# Patient Record
Sex: FEMALE | Race: WHITE | Employment: OTHER | URBAN - NONMETROPOLITAN AREA
[De-identification: names, ages, dates, MRNs, and addresses within clinical notes are randomized per-mention and may not be internally consistent; named-entity substitution may affect disease eponyms.]

---

## 2019-02-14 LAB
A/G RATIO: NORMAL
ALBUMIN SERPL-MCNC: 4.3 G/DL
ALP BLD-CCNC: 94 U/L
ALT SERPL-CCNC: 8 U/L
AST SERPL-CCNC: 18 U/L
BASOPHILS ABSOLUTE: ABNORMAL /ΜL
BASOPHILS RELATIVE PERCENT: ABNORMAL %
BILIRUB SERPL-MCNC: 0.5 MG/DL (ref 0.1–1.4)
BILIRUBIN DIRECT: 0.1 MG/DL
BILIRUBIN, INDIRECT: NORMAL
BUN BLDV-MCNC: 24 MG/DL
CALCIUM SERPL-MCNC: NORMAL MG/DL
CHLORIDE BLD-SCNC: 105 MMOL/L
CO2: 25 MMOL/L
CREAT SERPL-MCNC: 1.36 MG/DL
EOSINOPHILS ABSOLUTE: ABNORMAL /ΜL
EOSINOPHILS RELATIVE PERCENT: ABNORMAL %
GFR CALCULATED: 37
GLOBULIN: NORMAL
GLUCOSE BLD-MCNC: 54 MG/DL
HCT VFR BLD CALC: 50.9 % (ref 36–46)
HEMOGLOBIN: 15.3 G/DL (ref 12–16)
LYMPHOCYTES ABSOLUTE: ABNORMAL /ΜL
LYMPHOCYTES RELATIVE PERCENT: ABNORMAL %
MCH RBC QN AUTO: ABNORMAL PG
MCHC RBC AUTO-ENTMCNC: ABNORMAL G/DL
MCV RBC AUTO: ABNORMAL FL
MONOCYTES ABSOLUTE: ABNORMAL /ΜL
MONOCYTES RELATIVE PERCENT: ABNORMAL %
NEUTROPHILS ABSOLUTE: ABNORMAL /ΜL
NEUTROPHILS RELATIVE PERCENT: ABNORMAL %
PLATELET # BLD: 194 K/ΜL
PMV BLD AUTO: ABNORMAL FL
POTASSIUM SERPL-SCNC: 4.9 MMOL/L
PROTEIN TOTAL: 7.3 G/DL
RBC # BLD: 5.23 10^6/ΜL
SODIUM BLD-SCNC: 141 MMOL/L
WBC # BLD: 10.07 10^3/ML

## 2019-02-26 PROBLEM — M17.12 PRIMARY OSTEOARTHRITIS OF LEFT KNEE: Status: ACTIVE | Noted: 2018-06-04

## 2019-02-26 PROBLEM — R29.898 WEAKNESS OF RIGHT HIP: Status: ACTIVE | Noted: 2017-02-20

## 2019-02-26 PROBLEM — M19.90 ARTHRITIS: Status: ACTIVE | Noted: 2018-08-14

## 2019-02-26 PROBLEM — M17.12 OSTEOARTHRITIS OF LEFT KNEE: Status: ACTIVE | Noted: 2018-08-16

## 2019-02-26 PROBLEM — M17.12 ARTHRITIS OF LEFT KNEE: Status: ACTIVE | Noted: 2018-03-05

## 2019-02-26 PROBLEM — E66.01 OBESITY, MORBID, BMI 40.0-49.9 (HCC): Status: ACTIVE | Noted: 2017-08-22

## 2019-02-26 PROBLEM — R26.89 IMBALANCE: Status: ACTIVE | Noted: 2017-02-20

## 2019-02-26 PROBLEM — I25.10 ARTERIOSCLEROTIC CORONARY ARTERY DISEASE: Status: ACTIVE | Noted: 2019-02-26

## 2019-02-26 RX ORDER — AMIODARONE HYDROCHLORIDE 200 MG/1
100 TABLET ORAL DAILY
COMMUNITY
Start: 2018-09-24

## 2019-02-26 RX ORDER — LEVOTHYROXINE SODIUM 0.07 MG/1
88 TABLET ORAL DAILY
COMMUNITY
End: 2023-01-20

## 2019-02-26 RX ORDER — ISOSORBIDE MONONITRATE 30 MG/1
30 TABLET, EXTENDED RELEASE ORAL DAILY
COMMUNITY
End: 2023-01-20

## 2019-02-26 RX ORDER — METOPROLOL SUCCINATE 25 MG/1
25 TABLET, EXTENDED RELEASE ORAL DAILY
COMMUNITY
End: 2019-10-08 | Stop reason: ALTCHOICE

## 2019-02-26 RX ORDER — DILTIAZEM HYDROCHLORIDE 300 MG/1
300 CAPSULE, EXTENDED RELEASE ORAL DAILY
COMMUNITY
End: 2020-03-06 | Stop reason: ALTCHOICE

## 2019-02-26 RX ORDER — SENNOSIDES 8.6 MG
650 CAPSULE ORAL PRN
COMMUNITY

## 2019-03-01 ENCOUNTER — OFFICE VISIT (OUTPATIENT)
Dept: NEPHROLOGY | Age: 84
End: 2019-03-01
Payer: MEDICARE

## 2019-03-01 VITALS
DIASTOLIC BLOOD PRESSURE: 70 MMHG | HEART RATE: 76 BPM | BODY MASS INDEX: 40.32 KG/M2 | HEIGHT: 65 IN | WEIGHT: 242 LBS | SYSTOLIC BLOOD PRESSURE: 126 MMHG | OXYGEN SATURATION: 98 %

## 2019-03-01 DIAGNOSIS — N18.30 CKD (CHRONIC KIDNEY DISEASE), STAGE III (HCC): Primary | ICD-10-CM

## 2019-03-01 DIAGNOSIS — I10 ESSENTIAL HYPERTENSION: ICD-10-CM

## 2019-03-01 PROCEDURE — 99203 OFFICE O/P NEW LOW 30 MIN: CPT | Performed by: INTERNAL MEDICINE

## 2019-03-14 ENCOUNTER — HOSPITAL ENCOUNTER (OUTPATIENT)
Dept: ULTRASOUND IMAGING | Age: 84
Discharge: HOME | End: 2019-03-14
Payer: MEDICARE

## 2019-03-14 DIAGNOSIS — N18.30 CKD (CHRONIC KIDNEY DISEASE), STAGE III (HCC): ICD-10-CM

## 2019-03-14 PROCEDURE — 76770 US EXAM ABDO BACK WALL COMP: CPT

## 2019-09-03 LAB
BUN BLDV-MCNC: 21 MG/DL
CALCIUM SERPL-MCNC: 9.5 MG/DL
CHLORIDE BLD-SCNC: 106 MMOL/L
CO2: 23 MMOL/L
CREAT SERPL-MCNC: 1.7 MG/DL
GFR CALCULATED: 31
GLUCOSE BLD-MCNC: 91 MG/DL
POTASSIUM SERPL-SCNC: 4.6 MMOL/L
SODIUM BLD-SCNC: 143 MMOL/L

## 2019-09-16 LAB
BUN BLDV-MCNC: 21 MG/DL
CALCIUM SERPL-MCNC: 9.7 MG/DL
CHLORIDE BLD-SCNC: 109 MMOL/L
CO2: 24 MMOL/L
CREAT SERPL-MCNC: 1.8 MG/DL
GFR CALCULATED: 29
GLUCOSE BLD-MCNC: 89 MG/DL
POTASSIUM SERPL-SCNC: 4.8 MMOL/L
SODIUM BLD-SCNC: 147 MMOL/L

## 2019-09-17 ENCOUNTER — TELEPHONE (OUTPATIENT)
Dept: NEPHROLOGY | Age: 84
End: 2019-09-17

## 2019-09-17 DIAGNOSIS — N18.30 CKD (CHRONIC KIDNEY DISEASE), STAGE III (HCC): Primary | ICD-10-CM

## 2019-10-01 ENCOUNTER — TELEPHONE (OUTPATIENT)
Dept: NEPHROLOGY | Age: 84
End: 2019-10-01

## 2019-10-01 DIAGNOSIS — N18.30 CKD (CHRONIC KIDNEY DISEASE), STAGE III (HCC): Primary | ICD-10-CM

## 2019-10-08 LAB
BUN BLDV-MCNC: 25 MG/DL
CALCIUM SERPL-MCNC: 9.7 MG/DL
CHLORIDE BLD-SCNC: 103 MMOL/L
CO2: 25 MMOL/L
CREAT SERPL-MCNC: 1.5 MG/DL
GFR CALCULATED: 35
GLUCOSE BLD-MCNC: 97 MG/DL
POTASSIUM SERPL-SCNC: 4.6 MMOL/L
SODIUM BLD-SCNC: 143 MMOL/L

## 2019-10-08 RX ORDER — POTASSIUM CHLORIDE 750 MG/1
10 CAPSULE, EXTENDED RELEASE ORAL EVERY OTHER DAY
COMMUNITY
End: 2020-09-18 | Stop reason: SDUPTHER

## 2019-10-08 RX ORDER — FUROSEMIDE 20 MG/1
20 TABLET ORAL EVERY OTHER DAY
COMMUNITY
End: 2020-09-18 | Stop reason: SDUPTHER

## 2019-10-10 ENCOUNTER — TELEPHONE (OUTPATIENT)
Dept: NEPHROLOGY | Age: 84
End: 2019-10-10

## 2019-10-10 DIAGNOSIS — N18.30 CKD (CHRONIC KIDNEY DISEASE), STAGE III (HCC): Primary | ICD-10-CM

## 2020-03-02 LAB
BASOPHILS ABSOLUTE: NORMAL
BASOPHILS RELATIVE PERCENT: NORMAL
BUN BLDV-MCNC: 18 MG/DL
CALCIUM SERPL-MCNC: 9.7 MG/DL
CHLORIDE BLD-SCNC: 107 MMOL/L
CO2: 25 MMOL/L
CREAT SERPL-MCNC: 1 MG/DL
EOSINOPHILS ABSOLUTE: NORMAL
EOSINOPHILS RELATIVE PERCENT: NORMAL
GFR CALCULATED: 56
GLUCOSE BLD-MCNC: 97 MG/DL
HCT VFR BLD CALC: 44.3 % (ref 36–46)
HEMOGLOBIN: 13.8 G/DL (ref 12–16)
LYMPHOCYTES ABSOLUTE: NORMAL
LYMPHOCYTES RELATIVE PERCENT: NORMAL
MCH RBC QN AUTO: NORMAL PG
MCHC RBC AUTO-ENTMCNC: NORMAL G/DL
MCV RBC AUTO: NORMAL FL
MONOCYTES ABSOLUTE: NORMAL
MONOCYTES RELATIVE PERCENT: NORMAL
NEUTROPHILS ABSOLUTE: NORMAL
NEUTROPHILS RELATIVE PERCENT: NORMAL
PLATELET # BLD: 150 K/ΜL
PMV BLD AUTO: NORMAL FL
POTASSIUM SERPL-SCNC: 4.1 MMOL/L
PTH INTACT: 81
RBC # BLD: 4.79 10^6/ΜL
SODIUM BLD-SCNC: 145 MMOL/L
WBC # BLD: 6.1 10^3/ML

## 2020-03-04 NOTE — PROGRESS NOTES
(108 kg)   SpO2 95%   BMI 39.62 kg/m²    Body mass index is 39.62 kg/m². CONSTITUTIONAL:  No acute distress. Sitting comfortable in chair  HEENT:  Head is normocephalic, Extraocular movement intact,  Neck is supple  CARDIOVASCULAR:  trace lower extremity edema  RESPIRATORY: No shortness of breath. ABDOMEN: soft  NEUROLOGICAL: Patient is alert and oriented to person, place, and time. Recent and remote memory is intact. Thought is coherant. SKIN: No rash on exposed surfaces  MUSCULOSKELETAL: Movement is coordinated. EXTREMITIES: Distal lower extremity temp is warm,   PSYCHIATRIC: mood and affect appropriate    Meds reviewed and discussed with pt  Current Outpatient Medications   Medication Sig Dispense Refill    potassium chloride (MICRO-K) 10 MEQ extended release capsule Take 10 mEq by mouth every other day      furosemide (LASIX) 20 MG tablet Take 20 mg by mouth every other day      levothyroxine (SYNTHROID) 75 MCG tablet Take 88 mcg by mouth daily       isosorbide mononitrate (IMDUR) 30 MG extended release tablet Take 30 mg by mouth daily      acetaminophen (TYLENOL) 650 MG extended release tablet Take 650 mg by mouth as needed      amiodarone (CORDARONE) 200 MG tablet Take 200 mg by mouth daily      rivaroxaban (XARELTO) 20 MG TABS tablet Take 20 mg by mouth daily       No current facility-administered medications for this visit.         Diagnostic Data:  Lab Results   Component Value Date    CREATININE 1.0 03/02/2020    CREATININE 1.5 10/08/2019    CREATININE 1.8 09/16/2019    CREATININE 1.7 09/03/2019    CREATININE 1.36 02/14/2019     Lab Results   Component Value Date     03/02/2020    K 4.1 03/02/2020     03/02/2020    CO2 25.0 03/02/2020    BUN 18 03/02/2020    CREATININE 1.0 03/02/2020    LABGLOM 56 03/02/2020    GLUCOSE 97 03/02/2020       Lab Results   Component Value Date    CALCIUM 9.7 03/02/2020       Lab Results   Component Value Date    WBC 6.1 03/02/2020    HGB 13.8 03/02/2020    HCT 44.3 03/02/2020     03/02/2020           Assessment:    · Acute Kidney Injury resolved,   · Chronic Kidney Disease Stage III. Renal US dated 9/12/19 revealed bilateral cortical atrophy/scarring, Lt>Rt. No hydronephrosis. · Essential Hypertension with nephrosclerosis at control. Ok to continue Lasix and potassium every other day. Advised to drink water when thirsty rather than pushing fluid  · Atrial fib on Xarelto     Orders Placed This Encounter   Procedures    Basic Metabolic Panel    PTH, Intact    Vitamin D 25 Hydroxy       Pt asked to check home BP and record BP readings and bring to office appts  Avoid nonsteroidal anti inflammatory drugs such as Ibuprofen, Aleve, Advil, Motrin. Schedule for follow up appt in 6 months. Pt asked to bring pill bottles to next office visit. Please make early appointment if needed and to call office for questions, concerns, persistent, changing or worsening symptoms.   Discussed with the patient and answered their questions     Shauna Villatoro, CRISTIANE - CNP

## 2020-03-06 ENCOUNTER — OFFICE VISIT (OUTPATIENT)
Dept: NEPHROLOGY | Age: 85
End: 2020-03-06
Payer: MEDICARE

## 2020-03-06 VITALS
HEIGHT: 65 IN | SYSTOLIC BLOOD PRESSURE: 138 MMHG | WEIGHT: 238.1 LBS | HEART RATE: 65 BPM | OXYGEN SATURATION: 95 % | BODY MASS INDEX: 39.67 KG/M2 | DIASTOLIC BLOOD PRESSURE: 84 MMHG

## 2020-03-06 PROCEDURE — 99213 OFFICE O/P EST LOW 20 MIN: CPT | Performed by: NURSE PRACTITIONER

## 2020-09-03 LAB
BUN BLDV-MCNC: 13 MG/DL
CALCIUM SERPL-MCNC: 9.3 MG/DL
CHLORIDE BLD-SCNC: 106 MMOL/L
CO2: 26 MMOL/L
CREAT SERPL-MCNC: 1 MG/DL
GFR CALCULATED: 56
GLUCOSE BLD-MCNC: 93 MG/DL
POTASSIUM SERPL-SCNC: 4.1 MMOL/L
PTH INTACT: 95
SODIUM BLD-SCNC: 141 MMOL/L
VITAMIN D 25-HYDROXY: 17
VITAMIN D2, 25 HYDROXY: NORMAL
VITAMIN D3,25 HYDROXY: NORMAL

## 2020-09-18 ENCOUNTER — OFFICE VISIT (OUTPATIENT)
Dept: NEPHROLOGY | Age: 85
End: 2020-09-18
Payer: MEDICARE

## 2020-09-18 VITALS
HEART RATE: 66 BPM | DIASTOLIC BLOOD PRESSURE: 64 MMHG | OXYGEN SATURATION: 96 % | BODY MASS INDEX: 40.27 KG/M2 | SYSTOLIC BLOOD PRESSURE: 132 MMHG | WEIGHT: 242 LBS

## 2020-09-18 PROCEDURE — 99213 OFFICE O/P EST LOW 20 MIN: CPT | Performed by: INTERNAL MEDICINE

## 2020-09-18 RX ORDER — MELATONIN
1000 DAILY
Qty: 90 TABLET | Refills: 3 | Status: SHIPPED | OUTPATIENT
Start: 2020-09-18

## 2020-09-18 RX ORDER — FUROSEMIDE 20 MG/1
20 TABLET ORAL DAILY
Qty: 90 TABLET | Refills: 3 | Status: SHIPPED | OUTPATIENT
Start: 2020-09-18 | End: 2021-09-23 | Stop reason: SDUPTHER

## 2020-09-18 RX ORDER — POTASSIUM CHLORIDE 750 MG/1
10 CAPSULE, EXTENDED RELEASE ORAL DAILY
Qty: 60 CAPSULE | Refills: 3 | Status: SHIPPED | OUTPATIENT
Start: 2020-09-18 | End: 2021-09-24 | Stop reason: SDUPTHER

## 2020-09-18 NOTE — PROGRESS NOTES
[facility]  [address] [facility]  [address] [facility] [address] [place] [address]  Dept: [phone]  Loc: [phone]  Progress Note  9/18/2020 9:55 AM      Pt Name:    Peggye Schwab  YOB: 1935  Primary Care Physician:  Vivienne Ragsdale DO     Chief Complaint:   Chief Complaint   Patient presents with    Follow-up     CKDIII        History of Present Illness: This is a follow-up visit for CKD III. History of HTN, Afib, hypothyroidism, nystagmus, OA. Eating high sodium diet. She does have some swelling in legs. Wears compression stockings. Chronic DC. On lasix 20 mg every other day, potassium 10 meq every other day. Pertinent items are noted in HPI. Past History:  Past Medical History:   Diagnosis Date    Atrial fibrillation (HCC)     CKD (chronic kidney disease) stage 3, GFR 30-59 ml/min (HCC)     Hypertension     Nystagmus      No past surgical history on file. VITALS:  /64 (Site: Right Upper Arm, Position: Sitting, Cuff Size: Large Adult) Comment: manual  Pulse 66   Wt 242 lb (109.8 kg)   SpO2 96%   BMI 40.27 kg/m²   Wt Readings from Last 3 Encounters:   09/18/20 242 lb (109.8 kg)   03/06/20 238 lb 1.6 oz (108 kg)   03/01/19 242 lb (109.8 kg)     Body mass index is 40.27 kg/m².      General Appearance: alert and cooperative with exam, appears comfortable, no distress, pleasant  HEENT: EOMI, moist oral mucus membranes  Neck: No jugular venous distention  Lungs: Air entry B/L, no crackles or rales, no use of accessory muscles  Heart: irregular  GI: soft, non-tender, no guarding  Extremities: trace LE edema, +compression stockings  Skin: warm, dry  Neurologic: no tremor, nytsagmus     Medications:  Current Outpatient Medications   Medication Sig Dispense Refill    furosemide (LASIX) 20 MG tablet Take 1 tablet by mouth daily 90 tablet 3    potassium chloride (MICRO-K) 10 MEQ extended release capsule Take 1 capsule by mouth daily 60 capsule 3    vitamin D3 (CHOLECALCIFEROL) 25 MCG (1000 UT) TABS tablet Take 1 tablet by mouth daily 90 tablet 3    levothyroxine (SYNTHROID) 75 MCG tablet Take 88 mcg by mouth daily       isosorbide mononitrate (IMDUR) 30 MG extended release tablet Take 30 mg by mouth daily      acetaminophen (TYLENOL) 650 MG extended release tablet Take 650 mg by mouth as needed      amiodarone (CORDARONE) 200 MG tablet Take 200 mg by mouth daily      rivaroxaban (XARELTO) 20 MG TABS tablet Take 20 mg by mouth daily       No current facility-administered medications for this visit. Laboratory & Diagnostics:  CBC:   Lab Results   Component Value Date    WBC 6.1 03/02/2020    HGB 13.8 03/02/2020    HCT 44.3 03/02/2020     03/02/2020     BMP:    Lab Results   Component Value Date     09/03/2020     03/02/2020     10/08/2019    K 4.1 09/03/2020    K 4.1 03/02/2020    K 4.6 10/08/2019     09/03/2020     03/02/2020     10/08/2019    CO2 26.0 09/03/2020    CO2 25.0 03/02/2020    CO2 25.0 10/08/2019    BUN 13 09/03/2020    BUN 18 03/02/2020    BUN 25 10/08/2019    CREATININE 1.0 09/03/2020    CREATININE 1.0 03/02/2020    CREATININE 1.5 10/08/2019    GLUCOSE 93 09/03/2020    GLUCOSE 97 03/02/2020    GLUCOSE 97 10/08/2019      Hepatic:   Lab Results   Component Value Date    AST 18 02/14/2019    ALT 8 02/14/2019    BILITOT 0.5 02/14/2019    ALKPHOS 94 02/14/2019     BNP: No results found for: BNP  Lipids: No results found for: CHOL, HDL  INR: No results found for: INR  URINE: No results found for: NAUR, PROTUR  No results found for: NITRU, COLORU, PHUR, LABCAST, WBCUA, RBCUA, MUCUS, TRICHOMONAS, YEAST, BACTERIA, CLARITYU, SPECGRAV, LEUKOCYTESUR, UROBILINOGEN, BILIRUBINUR, BLOODU, GLUCOSEU, KETUA, AMORPHOUS   Microalbumen/Creatinine ratio:  No components found for: RUCREAT        Impression/Plan:   1. CKD IIIa: renal function is improved.  Stopped herbal supplements. No significant proteinuria on past UA. Renal US unremarkable. Goals of care include slowing rate of progression by controlling blood pressure and by avoiding nephrotoxins such as NSAIDs and IV contrast.   2. Chronic Edema: wears compression stockings. Discussed low sodium diet. Will increase lasix to 20 mg daily from QOD. Repeat a bmp in 1 month  3. Electrolytes:  K 4.1 on KCl 10 meq QOD increase to daily with lasix  4. HTN: controlled  5. Vitamin D deficiency: start D3 1000 units daily  6. Afib on Xarelto    Bloodwork and medications were reviewed and plan of care discussed with the patient. Return to clinic in 6 months  or sooner if the need arises.       Krzysztof Head,   [facility]

## 2020-09-18 NOTE — PATIENT INSTRUCTIONS
Your kidney function looks good, creatinine is 1.0  Low sodium diet. Take Lasix 20 mg daily and potassium chloride 10 meq daily.   Start Taking Vitamin D3 1000 units daily  Repeat bloodwork in 1 month

## 2021-03-10 LAB
ALBUMIN SERPL-MCNC: 4.2 G/DL
ALP BLD-CCNC: 97 U/L
ALT SERPL-CCNC: 12 U/L
ANION GAP SERPL CALCULATED.3IONS-SCNC: NORMAL MMOL/L
AST SERPL-CCNC: 16 U/L
BILIRUB SERPL-MCNC: 0.8 MG/DL (ref 0.1–1.4)
BUN BLDV-MCNC: 20 MG/DL
CALCIUM SERPL-MCNC: 9.6 MG/DL
CHLORIDE BLD-SCNC: 106 MMOL/L
CO2: 27 MMOL/L
CREAT SERPL-MCNC: 0.9 MG/DL
GFR CALCULATED: >60
GLUCOSE BLD-MCNC: 91 MG/DL
POTASSIUM SERPL-SCNC: 4.5 MMOL/L
SODIUM BLD-SCNC: 142 MMOL/L
TOTAL PROTEIN: 7.1
VITAMIN D 25-HYDROXY: 18
VITAMIN D2, 25 HYDROXY: NORMAL
VITAMIN D3,25 HYDROXY: NORMAL

## 2021-03-19 ENCOUNTER — OFFICE VISIT (OUTPATIENT)
Dept: NEPHROLOGY | Age: 86
End: 2021-03-19
Payer: MEDICARE

## 2021-03-19 VITALS
DIASTOLIC BLOOD PRESSURE: 68 MMHG | WEIGHT: 247 LBS | BODY MASS INDEX: 41.1 KG/M2 | OXYGEN SATURATION: 98 % | HEART RATE: 68 BPM | SYSTOLIC BLOOD PRESSURE: 122 MMHG

## 2021-03-19 DIAGNOSIS — E55.9 VITAMIN D DEFICIENCY: ICD-10-CM

## 2021-03-19 DIAGNOSIS — N18.2 CKD (CHRONIC KIDNEY DISEASE), STAGE II: Primary | ICD-10-CM

## 2021-03-19 PROCEDURE — 99213 OFFICE O/P EST LOW 20 MIN: CPT | Performed by: INTERNAL MEDICINE

## 2021-03-19 RX ORDER — AMLODIPINE BESYLATE 10 MG/1
10 TABLET ORAL DAILY
COMMUNITY
Start: 2021-03-12 | End: 2022-03-18 | Stop reason: ALTCHOICE

## 2021-03-19 RX ORDER — CYANOCOBALAMIN (VITAMIN B-12) 1000 MCG
1 TABLET, EXTENDED RELEASE ORAL 2 TIMES DAILY WITH MEALS
COMMUNITY
End: 2022-03-18 | Stop reason: ALTCHOICE

## 2021-03-19 RX ORDER — LANOLIN ALCOHOL/MO/W.PET/CERES
1000 CREAM (GRAM) TOPICAL DAILY
COMMUNITY

## 2021-03-19 NOTE — PROGRESS NOTES
[facility] [place] [facility]  [facility]  [address] [place] [address]  Dept: [phone]  Loc: [phone]  Progress Note  3/19/2021 9:56 AM      Pt Name:    Micki Cohn  YOB: 1935  Primary Care Physician:  Pebbles Valladares DO     Chief Complaint:   Chief Complaint   Patient presents with    Follow-up     CKD III        History of Present Illness: This is a follow-up visit for CKD III. History of HTN, Afib, hypothyroidism, nystagmus, OA. Since last visit she had a rash and allergic reaction to Keflex. Doing better now. Taking lasix 20 mg daily and KCl 10 meq daily. She thinks swelling is better. Has chronic edema and wears compression stockings. Chronic DC. Vit D was increased to 5000 units daily. Pertinent items are noted in HPI. Past History:  Past Medical History:   Diagnosis Date    Atrial fibrillation (HCC)     CKD (chronic kidney disease) stage 3, GFR 30-59 ml/min     Hypertension     Nystagmus      No past surgical history on file. VITALS:  /68 (Site: Left Upper Arm, Position: Sitting, Cuff Size: Large Adult)   Pulse 68   Wt 247 lb (112 kg)   SpO2 98%   BMI 41.10 kg/m²   Wt Readings from Last 3 Encounters:   03/19/21 247 lb (112 kg)   09/18/20 242 lb (109.8 kg)   03/06/20 238 lb 1.6 oz (108 kg)     Body mass index is 41.1 kg/m².      General Appearance: alert and cooperative with exam, appears comfortable, no distress, pleasant  HEENT: EOMI, moist oral mucus membranes  Neck: No jugular venous distention  Lungs: Air entry B/L, no crackles or rales, no use of accessory muscles  Heart: irregular  GI: soft, non-tender, no guarding  Extremities: trace LE edema, +compression stockings  Skin: warm, dry  Neurologic: no tremor, nytsagmus     Medications:  Current Outpatient Medications   Medication Sig Dispense Refill    amLODIPine (NORVASC) 10 MG tablet Take 10 mg by mouth daily      vitamin B-12 (CYANOCOBALAMIN) 1000 MCG tablet Take 1,000 mcg by mouth daily      calcium citrate-vitamin D (CITRICAL + D) 315-250 MG-UNIT TABS per tablet Take 1 tablet by mouth 2 times daily (with meals)      furosemide (LASIX) 20 MG tablet Take 1 tablet by mouth daily 90 tablet 3    potassium chloride (MICRO-K) 10 MEQ extended release capsule Take 1 capsule by mouth daily 60 capsule 3    vitamin D3 (CHOLECALCIFEROL) 25 MCG (1000 UT) TABS tablet Take 1 tablet by mouth daily (Patient taking differently: Take 5,000 Units by mouth daily ) 90 tablet 3    levothyroxine (SYNTHROID) 75 MCG tablet Take 88 mcg by mouth daily       isosorbide mononitrate (IMDUR) 30 MG extended release tablet Take 30 mg by mouth daily      acetaminophen (TYLENOL) 650 MG extended release tablet Take 650 mg by mouth as needed      amiodarone (CORDARONE) 200 MG tablet Take 200 mg by mouth daily      rivaroxaban (XARELTO) 20 MG TABS tablet Take 20 mg by mouth daily       No current facility-administered medications for this visit.          Laboratory & Diagnostics:  CBC:   Lab Results   Component Value Date    WBC 6.1 03/02/2020    HGB 13.8 03/02/2020    HCT 44.3 03/02/2020     03/02/2020     BMP:    Lab Results   Component Value Date     03/10/2021     09/03/2020     03/02/2020    K 4.5 03/10/2021    K 4.1 09/03/2020    K 4.1 03/02/2020     03/10/2021     09/03/2020     03/02/2020    CO2 27.0 03/10/2021    CO2 26.0 09/03/2020    CO2 25.0 03/02/2020    BUN 20 03/10/2021    BUN 13 09/03/2020    BUN 18 03/02/2020    CREATININE 0.9 03/10/2021    CREATININE 1.0 09/03/2020    CREATININE 1.0 03/02/2020    GLUCOSE 91 03/10/2021    GLUCOSE 93 09/03/2020    GLUCOSE 97 03/02/2020      Hepatic:   Lab Results   Component Value Date    AST 16 03/10/2021    AST 18 02/14/2019    ALT 12 03/10/2021    ALT 8 02/14/2019    BILITOT 0.8 03/10/2021    BILITOT 0.5 02/14/2019    ALKPHOS 97 03/10/2021    ALKPHOS 94 02/14/2019     BNP: No results found for: BNP  Lipids: No results found for: CHOL, HDL  INR: No results found for: INR  URINE: No results found for: NAUR, PROTUR  No results found for: NITRU, COLORU, PHUR, LABCAST, WBCUA, RBCUA, MUCUS, TRICHOMONAS, YEAST, BACTERIA, CLARITYU, SPECGRAV, LEUKOCYTESUR, UROBILINOGEN, BILIRUBINUR, BLOODU, GLUCOSEU, KETUA, AMORPHOUS   Microalbumen/Creatinine ratio:  No components found for: RUCREAT        Impression/Plan:   1. CKD II/IIIa: renal fxn improved off herbal meds. 2. Chronic Edema: lasix 20 mg daily, compression stockings, low Na diet. 3. Electrolytes:  K 4.6 on KCl 10 meq daily  4. HTN: controlled  5. Vitamin D deficiency:increase D3 to 5000 units daily  6. PAfib    Bloodwork and medications were reviewed and plan of care discussed with the patient. Return to clinic in 1 year  or sooner if the need arises.       Apolinar Poole DO  [facility]

## 2021-08-26 NOTE — PROGRESS NOTES
NPO after midnight except for sip of water with heart/BP meds  Follow instructions given by surgeon including medications to hold   Bring insurance card and photo ID  Shower morning of surgery with liquid antibacterial soap  Wear loose comfortable clothing  Remove jewelry and do not bring valuables  Bring list of medications with dosages and how often taken if not reviewed with PAT    needed at discharge at [facility] 27years old  Call PAT at [phone] for questions

## 2021-08-26 NOTE — PROGRESS NOTES
EKG clearance form given to Dr. Domenic Noel  for review. OK to proceed with surgery as planned at surgery center.

## 2021-09-03 ENCOUNTER — HOSPITAL ENCOUNTER (OUTPATIENT)
Age: 86
Setting detail: OUTPATIENT SURGERY
Discharge: HOME | End: 2021-09-03
Attending: SPECIALIST | Admitting: SPECIALIST
Payer: MEDICARE

## 2021-09-03 ENCOUNTER — ANESTHESIA (OUTPATIENT)
Dept: OPERATING ROOM | Age: 86
End: 2021-09-03
Payer: MEDICARE

## 2021-09-03 ENCOUNTER — ANESTHESIA EVENT (OUTPATIENT)
Dept: OPERATING ROOM | Age: 86
End: 2021-09-03
Payer: MEDICARE

## 2021-09-03 VITALS
SYSTOLIC BLOOD PRESSURE: 141 MMHG | RESPIRATION RATE: 18 BRPM | BODY MASS INDEX: 42.51 KG/M2 | OXYGEN SATURATION: 92 % | DIASTOLIC BLOOD PRESSURE: 68 MMHG | WEIGHT: 231 LBS | HEIGHT: 62 IN | HEART RATE: 78 BPM | TEMPERATURE: 97 F

## 2021-09-03 VITALS
SYSTOLIC BLOOD PRESSURE: 150 MMHG | RESPIRATION RATE: 25 BRPM | OXYGEN SATURATION: 83 % | DIASTOLIC BLOOD PRESSURE: 70 MMHG

## 2021-09-03 PROCEDURE — 7100000010 HC PHASE II RECOVERY - FIRST 15 MIN: Performed by: SPECIALIST

## 2021-09-03 PROCEDURE — 3700000001 HC ADD 15 MINUTES (ANESTHESIA): Performed by: SPECIALIST

## 2021-09-03 PROCEDURE — 7100000011 HC PHASE II RECOVERY - ADDTL 15 MIN: Performed by: SPECIALIST

## 2021-09-03 PROCEDURE — 6370000000 HC RX 637 (ALT 250 FOR IP): Performed by: SPECIALIST

## 2021-09-03 PROCEDURE — 6360000002 HC RX W HCPCS: Performed by: SPECIALIST

## 2021-09-03 PROCEDURE — 3700000000 HC ANESTHESIA ATTENDED CARE: Performed by: SPECIALIST

## 2021-09-03 PROCEDURE — 2500000003 HC RX 250 WO HCPCS: Performed by: REGISTERED NURSE

## 2021-09-03 PROCEDURE — 3600000002 HC SURGERY LEVEL 2 BASE: Performed by: SPECIALIST

## 2021-09-03 PROCEDURE — 2580000003 HC RX 258: Performed by: SPECIALIST

## 2021-09-03 PROCEDURE — 2500000003 HC RX 250 WO HCPCS: Performed by: SPECIALIST

## 2021-09-03 PROCEDURE — 3600000012 HC SURGERY LEVEL 2 ADDTL 15MIN: Performed by: SPECIALIST

## 2021-09-03 PROCEDURE — 2709999900 HC NON-CHARGEABLE SUPPLY: Performed by: SPECIALIST

## 2021-09-03 RX ORDER — ACETAMINOPHEN 325 MG/1
TABLET ORAL
Status: DISCONTINUED
Start: 2021-09-03 | End: 2021-09-03 | Stop reason: HOSPADM

## 2021-09-03 RX ORDER — LIDOCAINE HYDROCHLORIDE AND EPINEPHRINE 10; 10 MG/ML; UG/ML
INJECTION, SOLUTION INFILTRATION; PERINEURAL PRN
Status: DISCONTINUED | OUTPATIENT
Start: 2021-09-03 | End: 2021-09-03 | Stop reason: ALTCHOICE

## 2021-09-03 RX ORDER — ACETAMINOPHEN 325 MG/1
650 TABLET ORAL ONCE
Status: COMPLETED | OUTPATIENT
Start: 2021-09-03 | End: 2021-09-03

## 2021-09-03 RX ORDER — LIDOCAINE HYDROCHLORIDE 20 MG/ML
INJECTION, SOLUTION EPIDURAL; INFILTRATION; INTRACAUDAL; PERINEURAL PRN
Status: DISCONTINUED | OUTPATIENT
Start: 2021-09-03 | End: 2021-09-03 | Stop reason: SDUPTHER

## 2021-09-03 RX ORDER — SODIUM CHLORIDE 9 MG/ML
INJECTION, SOLUTION INTRAVENOUS CONTINUOUS
Status: DISCONTINUED | OUTPATIENT
Start: 2021-09-03 | End: 2021-09-03 | Stop reason: HOSPADM

## 2021-09-03 RX ORDER — PROPOFOL 10 MG/ML
INJECTION, EMULSION INTRAVENOUS PRN
Status: DISCONTINUED | OUTPATIENT
Start: 2021-09-03 | End: 2021-09-03 | Stop reason: SDUPTHER

## 2021-09-03 RX ADMIN — PROPOFOL 50 MG: 10 INJECTION, EMULSION INTRAVENOUS at 11:08

## 2021-09-03 RX ADMIN — SODIUM CHLORIDE: 9 INJECTION, SOLUTION INTRAVENOUS at 10:57

## 2021-09-03 RX ADMIN — PROPOFOL 75 MCG/KG/MIN: 10 INJECTION, EMULSION INTRAVENOUS at 11:22

## 2021-09-03 RX ADMIN — LIDOCAINE HYDROCHLORIDE 100 MG: 20 INJECTION, SOLUTION EPIDURAL; INFILTRATION; INTRACAUDAL; PERINEURAL at 11:08

## 2021-09-03 RX ADMIN — CEFAZOLIN 2000 MG: 10 INJECTION, POWDER, FOR SOLUTION INTRAVENOUS at 11:08

## 2021-09-03 RX ADMIN — ACETAMINOPHEN 650 MG: 325 TABLET ORAL at 12:16

## 2021-09-03 RX ADMIN — PROPOFOL 30 MG: 10 INJECTION, EMULSION INTRAVENOUS at 11:10

## 2021-09-03 RX ADMIN — PROPOFOL 40 MG: 10 INJECTION, EMULSION INTRAVENOUS at 11:21

## 2021-09-03 RX ADMIN — PROPOFOL 40 MG: 10 INJECTION, EMULSION INTRAVENOUS at 11:15

## 2021-09-03 ASSESSMENT — PULMONARY FUNCTION TESTS
PIF_VALUE: 0
PIF_VALUE: 1
PIF_VALUE: 0

## 2021-09-03 ASSESSMENT — PAIN SCALES - GENERAL
PAINLEVEL_OUTOF10: 5
PAINLEVEL_OUTOF10: 0

## 2021-09-03 NOTE — ANESTHESIA PRE PROCEDURE
Department of Anesthesiology  Preprocedure Note       Name:  Caden Tineo   Age:  82 y.o.  :  1935                                          MRN:  [id number]         Date:  9/3/2021      Surgeon: Sher Acevedo):  Lola Obando MD    Procedure: Procedure(s):  MOHS REPAIR BCC RIGHT NASOLABIAL FOLD    Medications prior to admission:   Prior to Admission medications    Medication Sig Start Date End Date Taking?  Authorizing Provider   amLODIPine (NORVASC) 10 MG tablet Take 10 mg by mouth daily 3/12/21  Yes Historical Provider, MD   furosemide (LASIX) 20 MG tablet Take 1 tablet by mouth daily 20  Yes Brayan Ball DO   levothyroxine (SYNTHROID) 75 MCG tablet Take 88 mcg by mouth daily    Yes Historical Provider, MD   isosorbide mononitrate (IMDUR) 30 MG extended release tablet Take 30 mg by mouth daily   Yes Historical Provider, MD   amiodarone (CORDARONE) 200 MG tablet Take 200 mg by mouth daily 18  Yes Historical Provider, MD   rivaroxaban (XARELTO) 20 MG TABS tablet Take 20 mg by mouth daily 18  Yes Historical Provider, MD   vitamin B-12 (CYANOCOBALAMIN) 1000 MCG tablet Take 1,000 mcg by mouth daily    Historical Provider, MD   calcium citrate-vitamin D (CITRICAL + D) 315-250 MG-UNIT TABS per tablet Take 1 tablet by mouth 2 times daily (with meals)    Historical Provider, MD   potassium chloride (MICRO-K) 10 MEQ extended release capsule Take 1 capsule by mouth daily 9/18/20 3/19/21  Karl Martinez DO   vitamin D3 (CHOLECALCIFEROL) 25 MCG (1000 UT) TABS tablet Take 1 tablet by mouth daily  Patient taking differently: Take 5,000 Units by mouth daily  20   Karl Martinez DO   acetaminophen (TYLENOL) 650 MG extended release tablet Take 650 mg by mouth as needed    Historical Provider, MD       Current medications:    Current Facility-Administered Medications   Medication Dose Route Frequency Provider Last Rate Last Admin    0.9 % sodium chloride infusion   IntraVENous Continuous Aneta Saldaña MD        ceFAZolin (ANCEF) 2000 mg in dextrose 5 % 50 mL IVPB  2,000 mg IntraVENous 60 Opal Solano MD           Allergies:     Allergies   Allergen Reactions    Keflex [Cephalexin] Rash    Morphine Itching       Problem List:    Patient Active Problem List   Diagnosis Code    Arteriosclerotic coronary artery disease I25.10    Arthritis M19.90    Arthritis of left knee M17.12    Obesity, morbid, BMI 40.0-49.9 ([facility]) E66.01    Downbeat nystagmus H55.09    Imbalance R26.89    Osteoarthritis of left knee M17.12    Primary osteoarthritis of left knee M17.12    Weakness of right hip R29.898    CKD (chronic kidney disease), stage III ([facility]) N18.30    Essential hypertension I10       Past Medical History:        Diagnosis Date    Atrial fibrillation ([facility])     CKD (chronic kidney disease) stage 3, GFR 30-59 ml/min ([facility])     Hypertension     Nystagmus     Thyroid disease        Past Surgical History:        Procedure Laterality Date    JOINT REPLACEMENT      bilat hips and knees       Social History:    Social History     Tobacco Use    Smoking status: Never Smoker    Smokeless tobacco: Never Used   Substance Use Topics    Alcohol use: Not Currently                                Counseling given: Not Answered      Vital Signs (Current):   Vitals:    08/26/21 1511 09/03/21 0917   BP:  (!) 140/80   Pulse:  73   Resp:  16   Temp:  98 °F (36.7 °C)   TempSrc:  Temporal   SpO2:  96%   Weight: 230 lb (104.3 kg) 231 lb (104.8 kg)   Height: 5' 2\" (1.575 m) 5' 2\" (1.575 m)                                              BP Readings from Last 3 Encounters:   09/03/21 (!) 140/80   03/19/21 122/68   09/18/20 132/64       NPO Status: Time of last liquid consumption: 0630                        Time of last solid consumption: 1300                        Date of last liquid consumption: 09/03/21                        Date of last solid food consumption: 09/02/21    BMI:   Wt Readings from Last 3 Encounters:   09/03/21 231 lb (104.8 kg)   03/19/21 247 lb (112 kg)   09/18/20 242 lb (109.8 kg)     Body mass index is 42.25 kg/m². CBC:   Lab Results   Component Value Date    WBC 6.1 03/02/2020    RBC 4.79 03/02/2020    HGB 13.8 03/02/2020    HCT 44.3 03/02/2020     03/02/2020       CMP:   Lab Results   Component Value Date     03/10/2021    K 4.5 03/10/2021     03/10/2021    CO2 27.0 03/10/2021    BUN 20 03/10/2021    CREATININE 0.9 03/10/2021    LABGLOM >60 03/10/2021    GLUCOSE 91 03/10/2021    PROT 7.3 02/14/2019    CALCIUM 9.6 03/10/2021    BILITOT 0.8 03/10/2021    ALKPHOS 97 03/10/2021    AST 16 03/10/2021    ALT 12 03/10/2021       POC Tests: No results for input(s): POCGLU, POCNA, POCK, POCCL, POCBUN, POCHEMO, POCHCT in the last 72 hours. Coags: No results found for: PROTIME, INR, APTT    HCG (If Applicable): No results found for: PREGTESTUR, PREGSERUM, HCG, HCGQUANT     ABGs: No results found for: PHART, PO2ART, HFZ3DCH, WDE7ZNQ, BEART, H3LRTZKX     Type & Screen (If Applicable):  No results found for: LABABO, LABRH    Drug/Infectious Status (If Applicable):  No results found for: HIV, HEPCAB    COVID-19 Screening (If Applicable): No results found for: COVID19        Anesthesia Evaluation  Patient summary reviewed  Airway: Mallampati: III  TM distance: >3 FB   Neck ROM: full  Mouth opening: > = 3 FB Dental:          Pulmonary:                              Cardiovascular:    (+) hypertension:, dysrhythmias:,                   Neuro/Psych:               GI/Hepatic/Renal:   (+) morbid obesity          Endo/Other:                     Abdominal:             Vascular: Other Findings:             Anesthesia Plan      MAC     ASA 3       Induction: intravenous. Anesthetic plan and risks discussed with patient. Plan discussed with SARIAH Arzola.  [address] [place] [address],    9/3/2021

## 2021-09-03 NOTE — ANESTHESIA POSTPROCEDURE EVALUATION
Department of Anesthesiology  Postprocedure Note    Patient: Robinson Messina  MRN: [id number]  YOB: 1935  Date of evaluation: 9/3/2021  Time:  1:07 PM     Procedure Summary     Date: 09/03/21 Room / Location: [facility]    Anesthesia Start: 1230 Anesthesia Stop: 8455    Procedure: MOHS REPAIR BCC RIGHT NASOLABIAL FOLD (Right Face) Diagnosis: ([facility] RIGHT NASOLABIAL FOLD)    Surgeons: Roxanna Huggins MD Responsible Provider: Maggy Franklin DO    Anesthesia Type: MAC ASA Status: 3          Anesthesia Type: MAC    Da Phase I:      Da Phase II: Da Score: 10    Last vitals: Reviewed and per EMR flowsheets.        Anesthesia Post Evaluation    Patient location during evaluation: floor  Patient participation: complete - patient participated  Level of consciousness: awake  Airway patency: patent  Nausea & Vomiting: no vomiting and no nausea  Cardiovascular status: hemodynamically stable  Respiratory status: acceptable  Hydration status: stable

## 2021-09-03 NOTE — OP NOTE
Operative Note    Patient name: Josse Prater             Medical Record Number: [id number]    Primary Care Physician: Armando Haywood DO     1935    Date of Procedure: 9/3/2021    Pre-operative Diagnosis: 4cm2 defect of right nasolabial fold s/p MOHS for basal cell carcinoma    Post-operative Diagnosis: Same    Procedure Performed: 7cm complex closure of right nasolabial fold 4cm2 defect (CPT 80256)    Surgeons/Assistants: MD Marlo Douglas PA-C    Estimated Blood Loss: 5ml     Complications: none immediately appreciated    Procedure: With the patient lying in the supine position and under adequate anesthesia per the anesthesia team, the area was anesthetized with a total of 17 ml of 1% Lidocaine 1:100,000 with epinephrine solution. The area was then prepped and draped in the standard surgical fashion. The 7cm complex closure was completed after extensive undermining to prevent distortion of the right upper lip/nose as the cheek was inset with 4-0 Monocryl suture placed in interrupted buried fashion. The dog-ear deformities were excised and final closure was completed using 5-0 fast absorbing suture with Histoacryl. The patient tolerated the procedure quite well and remained hemodynamically stable throughout the procedure and was quite comfortable throughout the operative course.     Clinical staging for cancer cases:  Kathya Balbuena MD  Electronically signed by me on 9/3/2021 at 12:51 PM  Operative Note      Patient: Josse Prater  YOB: 1935  MRN: [id number]    Date of Procedure: 9/3/2021    Pre-Op Diagnosis: BCC RIGHT NASOLABIAL FOLD    Post-Op Diagnosis: Same       Procedure(s):  MOHS REPAIR [address] RIGHT NASOLABIAL FOLD    Surgeon(s):  Kailash Manning MD    Assistant:   Physician Assistant: Marlo Kaiser PA-C    Anesthesia: Monitor Anesthesia Care    Estimated Blood Loss (mL): Minimal    Complications: None    Specimens:   * No specimens in log *    Implants:  * No implants in log *      Drains: * No LDAs found *    Findings: 4cm2 defect of right nasolabial fold s/p MOHS for basal cell carcinoma      Detailed Description of Procedure:   7cm complex closure of right nasolabial fold 4cm2 defect (CPT 70103)    Electronically signed by Ruel Quiñones MD on 9/3/2021 at 12:51 PM

## 2021-09-03 NOTE — PROGRESS NOTES
1144-  Patient arrived to phase II via cart. Spontaneous respirations even and unlabored. Placed on monitor--VSS. Report received from [facility]. 1145-  Assessment completed. Patient is alert and oriented x4. IV in left FA capped off-- no complications. See incision charting. Patient denies pain/N/V--will monitor. Patient states her lower back is sore. Patient repositioned. 1148-  Snack and drink given to patient. Family in room. 1155-  All belongings in room. 1205-  Patient doing well. 1210-  Patient states her surgical site is starting to hurt. Denies wanting [place].  States she prefers Tylenol. 1216-  650mg of Tylenol given. See MAR.   1220-  IV removed-- no complications. Bandage applied. Daughter helping patient dress. 1230-  Patient up to bathroom with daughter's assistance. 1235-  Discharge instructions given. Understanding verbalized. Saqib Bhatia 79-  Dr. Moises Shea in room talking with patient and family. 1250-  Patient discharged in stable condition with all belongings via wheelchair.

## 2021-09-03 NOTE — H&P
[facility]  History and Physical Update    Pt Name: Melvin Wagner  MRN: [id number]  YOB: 1935  Date of evaluation: 9/3/2021    I have examined the patient and reviewed the H&P/Consult and there are no changes to the patient or plans.       Nara Adamson MD  Electronically signed 9/3/2021 at 6:31 AM

## 2021-09-23 DIAGNOSIS — N18.30 CKD (CHRONIC KIDNEY DISEASE), STAGE III (HCC): ICD-10-CM

## 2021-09-23 DIAGNOSIS — E55.9 VITAMIN D DEFICIENCY: ICD-10-CM

## 2021-09-23 RX ORDER — FUROSEMIDE 20 MG/1
20 TABLET ORAL DAILY
Qty: 90 TABLET | Refills: 3 | Status: SHIPPED | OUTPATIENT
Start: 2021-09-23

## 2021-09-24 DIAGNOSIS — N18.30 CKD (CHRONIC KIDNEY DISEASE), STAGE III (HCC): ICD-10-CM

## 2021-09-24 DIAGNOSIS — E55.9 VITAMIN D DEFICIENCY: ICD-10-CM

## 2021-09-24 RX ORDER — POTASSIUM CHLORIDE 750 MG/1
10 CAPSULE, EXTENDED RELEASE ORAL DAILY
Qty: 60 CAPSULE | Refills: 3 | Status: SHIPPED | OUTPATIENT
Start: 2021-09-24 | End: 2022-03-18

## 2022-03-15 LAB
BUN BLDV-MCNC: 29 MG/DL
CALCIUM SERPL-MCNC: 10.5 MG/DL
CHLORIDE BLD-SCNC: 104 MMOL/L
CO2: 25 MMOL/L
CREAT SERPL-MCNC: 1 MG/DL
GFR CALCULATED: 51
GLUCOSE BLD-MCNC: 103 MG/DL
POTASSIUM SERPL-SCNC: 4.7 MMOL/L
SODIUM BLD-SCNC: 140 MMOL/L
VITAMIN D 25-HYDROXY: 37
VITAMIN D2, 25 HYDROXY: NORMAL
VITAMIN D3,25 HYDROXY: NORMAL

## 2022-03-18 ENCOUNTER — OFFICE VISIT (OUTPATIENT)
Dept: NEPHROLOGY | Age: 87
End: 2022-03-18
Payer: MEDICARE

## 2022-03-18 VITALS
OXYGEN SATURATION: 98 % | HEART RATE: 62 BPM | DIASTOLIC BLOOD PRESSURE: 74 MMHG | SYSTOLIC BLOOD PRESSURE: 128 MMHG | HEIGHT: 62 IN | WEIGHT: 227 LBS | BODY MASS INDEX: 41.77 KG/M2

## 2022-03-18 DIAGNOSIS — N18.31 STAGE 3A CHRONIC KIDNEY DISEASE (HCC): Primary | ICD-10-CM

## 2022-03-18 PROCEDURE — 99214 OFFICE O/P EST MOD 30 MIN: CPT | Performed by: INTERNAL MEDICINE

## 2022-03-18 NOTE — PROGRESS NOTES
[facility] [place] [facility]  [facility]  [address] [place] [address]  Dept: [phone]  Loc: [phone]  Progress Note  3/18/2022 9:19 AM      Pt Name:    Cyirl Lanza  YOB: 1935  Primary Care Physician:  Veronica Jimenez DO     Chief Complaint:   Chief Complaint   Patient presents with    Follow-up     CKD II        History of Present Illness: This is a follow-up visit for CKD III. History of HTN, Afib, hypothyroidism, nystagmus, OA. Has had some skin cancers and Mohs surgery this past year as well as cataract surgery. Had some issues with Afib and amlodipine was changed to diltiazem and metoprolol. HR today 62. Taking lasix 20 mg daily and KCl 10 meq daily for chronic edema which is stable. Pertinent items are noted in HPI. Past History:  Past Medical History:   Diagnosis Date    Atrial fibrillation (HCC)     CKD (chronic kidney disease) stage 3, GFR 30-59 ml/min (HCC)     Hypertension     Nystagmus     Thyroid disease      Past Surgical History:   Procedure Laterality Date    JOINT REPLACEMENT      bilat hips and knees    MOHS SURGERY Right 9/3/2021    MOHS REPAIR BCC RIGHT NASOLABIAL FOLD performed by Alek Rudolph MD at [address]:  /74 (Site: Right Upper Arm, Position: Sitting, Cuff Size: Large Adult)   Pulse 62   Ht 5' 2\" (1.575 m)   Wt 227 lb (103 kg)   SpO2 98%   BMI 41.52 kg/m²   Wt Readings from Last 3 Encounters:   03/18/22 227 lb (103 kg)   09/03/21 231 lb (104.8 kg)   03/19/21 247 lb (112 kg)     Body mass index is 41.52 kg/m².      General Appearance: alert and cooperative with exam, appears comfortable, no distress, pleasant  HEENT: EOMI, moist oral mucus membranes  Neck: No jugular venous distention  Lungs: Air entry B/L, no crackles or rales, no use of accessory muscles  Heart: irregular  GI: soft, non-tender, no guarding  Extremities: trace LE edema, +compression stockings  Skin: warm, dry  Neurologic: no tremor, nytsagmus     Medications:  Current Outpatient Medications   Medication Sig Dispense Refill    Metoprolol Succinate 25 MG CS24 Take 1 tablet by mouth 2 times daily      dilTIAZem (CARDIZEM LA) 120 MG TB24 extended release tablet Take by mouth      potassium chloride (MICRO-K) 10 MEQ extended release capsule Take 1 capsule by mouth daily 60 capsule 3    furosemide (LASIX) 20 MG tablet Take 1 tablet by mouth daily 90 tablet 3    vitamin B-12 (CYANOCOBALAMIN) 1000 MCG tablet Take 1,000 mcg by mouth daily      calcium citrate-vitamin D (CITRICAL + D) 315-250 MG-UNIT TABS per tablet Take 1 tablet by mouth 2 times daily (with meals)      vitamin D3 (CHOLECALCIFEROL) 25 MCG (1000 UT) TABS tablet Take 1 tablet by mouth daily (Patient taking differently: Take 5,000 Units by mouth daily ) 90 tablet 3    levothyroxine (SYNTHROID) 75 MCG tablet Take 88 mcg by mouth daily       isosorbide mononitrate (IMDUR) 30 MG extended release tablet Take 30 mg by mouth daily      acetaminophen (TYLENOL) 650 MG extended release tablet Take 650 mg by mouth as needed      amiodarone (CORDARONE) 200 MG tablet Take 200 mg by mouth daily      rivaroxaban (XARELTO) 20 MG TABS tablet Take 20 mg by mouth daily       No current facility-administered medications for this visit.         Laboratory & Diagnostics:  CBC:   Lab Results   Component Value Date    WBC 6.1 03/02/2020    HGB 13.8 03/02/2020    HCT 44.3 03/02/2020     03/02/2020     BMP:    Lab Results   Component Value Date     03/15/2022     03/10/2021     09/03/2020    K 4.7 03/15/2022    K 4.5 03/10/2021    K 4.1 09/03/2020     03/15/2022     03/10/2021     09/03/2020    CO2 25 03/15/2022    CO2 27.0 03/10/2021    CO2 26.0 09/03/2020    BUN 29 03/15/2022    BUN 20 03/10/2021    BUN 13 09/03/2020    CREATININE 1.0 03/15/2022    CREATININE 0.9 03/10/2021    CREATININE 1.0 09/03/2020    GLUCOSE 103 03/15/2022    GLUCOSE 91 03/10/2021    GLUCOSE 93 09/03/2020      Hepatic:   Lab Results   Component Value Date    AST 16 03/10/2021    AST 18 02/14/2019    ALT 12 03/10/2021    ALT 8 02/14/2019    BILITOT 0.8 03/10/2021    BILITOT 0.5 02/14/2019    ALKPHOS 97 03/10/2021    ALKPHOS 94 02/14/2019     BNP: No results found for: BNP  Lipids: No results found for: CHOL, HDL  INR: No results found for: INR  URINE: No results found for: NAUR, PROTUR  No results found for: NITRU, COLORU, PHUR, LABCAST, WBCUA, RBCUA, MUCUS, TRICHOMONAS, YEAST, BACTERIA, CLARITYU, SPECGRAV, LEUKOCYTESUR, UROBILINOGEN, BILIRUBINUR, BLOODU, GLUCOSEU, KETUA, AMORPHOUS   Microalbumen/Creatinine ratio:  No components found for: RUCREAT        Impression/Plan:   1. CKD IIIa: renal fxn improved off herbal meds. 2. Chronic Edema: lasix 20 mg daily, compression stockings, low Na diet. 3. Electrolytes:mild hypercalcemia, will stop Calcium supplement  4. HTN: controlled  5. Vitamin D deficiency:improved, can continue D3  6. PAfib    Bloodwork and medications were reviewed and plan of care discussed with the patient. Return to clinic in 1 year  or sooner if the need arises.       Zoe Burton DO  [facility]

## 2023-01-20 ENCOUNTER — NURSE ONLY (OUTPATIENT)
Dept: LAB | Age: 88
End: 2023-01-20

## 2023-01-20 ENCOUNTER — OFFICE VISIT (OUTPATIENT)
Dept: FAMILY MEDICINE CLINIC | Age: 88
End: 2023-01-20
Payer: MEDICARE

## 2023-01-20 VITALS
BODY MASS INDEX: 39.56 KG/M2 | TEMPERATURE: 97.5 F | HEIGHT: 62 IN | DIASTOLIC BLOOD PRESSURE: 78 MMHG | OXYGEN SATURATION: 98 % | WEIGHT: 215 LBS | SYSTOLIC BLOOD PRESSURE: 122 MMHG | HEART RATE: 82 BPM

## 2023-01-20 DIAGNOSIS — I25.10 ARTERIOSCLEROTIC CORONARY ARTERY DISEASE: ICD-10-CM

## 2023-01-20 DIAGNOSIS — H55.09 DOWNBEAT NYSTAGMUS: ICD-10-CM

## 2023-01-20 DIAGNOSIS — E03.9 ACQUIRED HYPOTHYROIDISM: ICD-10-CM

## 2023-01-20 DIAGNOSIS — Z91.81 AT HIGH RISK FOR FALLS: ICD-10-CM

## 2023-01-20 DIAGNOSIS — I10 ESSENTIAL HYPERTENSION: ICD-10-CM

## 2023-01-20 DIAGNOSIS — H55.00 NYSTAGMUS: ICD-10-CM

## 2023-01-20 DIAGNOSIS — N18.31 STAGE 3A CHRONIC KIDNEY DISEASE (HCC): ICD-10-CM

## 2023-01-20 DIAGNOSIS — R42 VERTIGO: Primary | ICD-10-CM

## 2023-01-20 DIAGNOSIS — R42 VERTIGO: ICD-10-CM

## 2023-01-20 LAB
ALBUMIN SERPL BCG-MCNC: 4.2 G/DL (ref 3.5–5.1)
ALP SERPL-CCNC: 150 U/L (ref 38–126)
ALT SERPL W/O P-5'-P-CCNC: 13 U/L (ref 11–66)
ANION GAP SERPL CALC-SCNC: 12 MEQ/L (ref 8–16)
AST SERPL-CCNC: 19 U/L (ref 5–40)
BILIRUB SERPL-MCNC: 0.5 MG/DL (ref 0.3–1.2)
BUN SERPL-MCNC: 20 MG/DL (ref 7–22)
CALCIUM SERPL-MCNC: 9.5 MG/DL (ref 8.5–10.5)
CHLORIDE SERPL-SCNC: 105 MEQ/L (ref 98–111)
CO2 SERPL-SCNC: 29 MEQ/L (ref 23–33)
CREAT SERPL-MCNC: 1.1 MG/DL (ref 0.4–1.2)
FOLATE SERPL-MCNC: 8.6 NG/ML (ref 4.8–24.2)
GFR SERPL CREATININE-BSD FRML MDRD: 49 ML/MIN/1.73M2
GLUCOSE SERPL-MCNC: 107 MG/DL (ref 70–108)
POTASSIUM SERPL-SCNC: 3.9 MEQ/L (ref 3.5–5.2)
PROT SERPL-MCNC: 6.7 G/DL (ref 6.1–8)
SODIUM SERPL-SCNC: 146 MEQ/L (ref 135–145)
TSH SERPL DL<=0.005 MIU/L-ACNC: 2.04 UIU/ML (ref 0.4–4.2)
VIT B12 SERPL-MCNC: 1153 PG/ML (ref 211–911)

## 2023-01-20 PROCEDURE — 99204 OFFICE O/P NEW MOD 45 MIN: CPT | Performed by: NURSE PRACTITIONER

## 2023-01-20 PROCEDURE — 3078F DIAST BP <80 MM HG: CPT | Performed by: NURSE PRACTITIONER

## 2023-01-20 PROCEDURE — 1123F ACP DISCUSS/DSCN MKR DOCD: CPT | Performed by: NURSE PRACTITIONER

## 2023-01-20 PROCEDURE — 3074F SYST BP LT 130 MM HG: CPT | Performed by: NURSE PRACTITIONER

## 2023-01-20 RX ORDER — ASPIRIN 81 MG/1
81 TABLET ORAL DAILY
COMMUNITY

## 2023-01-20 RX ORDER — MULTIVIT WITH MINERALS/LUTEIN
1000 TABLET ORAL DAILY
COMMUNITY

## 2023-01-20 RX ORDER — BENZONATATE 100 MG/1
100 CAPSULE ORAL 2 TIMES DAILY
COMMUNITY

## 2023-01-20 RX ORDER — PANTOPRAZOLE SODIUM 40 MG/1
40 TABLET, DELAYED RELEASE ORAL DAILY
COMMUNITY
Start: 2022-12-28

## 2023-01-20 RX ORDER — TAMSULOSIN HYDROCHLORIDE 0.4 MG/1
0.4 CAPSULE ORAL DAILY
COMMUNITY
Start: 2022-12-28

## 2023-01-20 RX ORDER — MECLIZINE HCL 12.5 MG/1
12.5 TABLET ORAL 3 TIMES DAILY PRN
Qty: 30 TABLET | Refills: 0 | Status: SHIPPED | OUTPATIENT
Start: 2023-01-20 | End: 2023-01-30

## 2023-01-20 RX ORDER — LEVOTHYROXINE SODIUM 88 UG/1
88 TABLET ORAL DAILY
COMMUNITY
Start: 2022-12-28

## 2023-01-20 RX ORDER — FUROSEMIDE 40 MG/1
40 TABLET ORAL DAILY
COMMUNITY

## 2023-01-20 SDOH — ECONOMIC STABILITY: FOOD INSECURITY: WITHIN THE PAST 12 MONTHS, YOU WORRIED THAT YOUR FOOD WOULD RUN OUT BEFORE YOU GOT MONEY TO BUY MORE.: NEVER TRUE

## 2023-01-20 SDOH — ECONOMIC STABILITY: FOOD INSECURITY: WITHIN THE PAST 12 MONTHS, THE FOOD YOU BOUGHT JUST DIDN'T LAST AND YOU DIDN'T HAVE MONEY TO GET MORE.: NEVER TRUE

## 2023-01-20 ASSESSMENT — ENCOUNTER SYMPTOMS
DIARRHEA: 0
NAUSEA: 0
ABDOMINAL DISTENTION: 0
APNEA: 0
PHOTOPHOBIA: 0
EYE PAIN: 0
SHORTNESS OF BREATH: 0
EYE DISCHARGE: 0
SORE THROAT: 0
CHEST TIGHTNESS: 0
BACK PAIN: 0
CONSTIPATION: 0
FACIAL SWELLING: 0

## 2023-01-20 ASSESSMENT — PATIENT HEALTH QUESTIONNAIRE - PHQ9
SUM OF ALL RESPONSES TO PHQ QUESTIONS 1-9: 0
SUM OF ALL RESPONSES TO PHQ QUESTIONS 1-9: 0
SUM OF ALL RESPONSES TO PHQ9 QUESTIONS 1 & 2: 0
SUM OF ALL RESPONSES TO PHQ QUESTIONS 1-9: 0
SUM OF ALL RESPONSES TO PHQ QUESTIONS 1-9: 0
1. LITTLE INTEREST OR PLEASURE IN DOING THINGS: 0
2. FEELING DOWN, DEPRESSED OR HOPELESS: 0

## 2023-01-20 ASSESSMENT — VISUAL ACUITY: OU: 1

## 2023-01-20 ASSESSMENT — SOCIAL DETERMINANTS OF HEALTH (SDOH): HOW HARD IS IT FOR YOU TO PAY FOR THE VERY BASICS LIKE FOOD, HOUSING, MEDICAL CARE, AND HEATING?: NOT HARD AT ALL

## 2023-01-20 NOTE — PROGRESS NOTES
[address]  Dept: Adore Proctor (:  1935) is a 82 y.o. female,New patient, here for evaluation of the following chief complaint(s):  New Patient, Establish Care, and Dizziness (Has had it for years and it has gotten worse.)      ASSESSMENT/PLAN:  I have reviewed the patient's medical history in detail and updated the computerized patient record. HPI/ROS per the patient and caregiver. Overall non toxic in appearance. Answers questions appropriately. Conditions discussed and addressed this visit include:   Here to establish care  Recent spiral fracture of the left leg. Back at home with help  Cody meyer  Ongoing issues with vertigo > 25 years  Most likely multifactorial  Causes fatigue and eye strain for pt. Will trial small dose of meclizine. Start PT for balance issues. Refer back to neuro  The patient is advised to follow a low fat, low cholesterol diet, attempt to lose weight, reduce salt in diet and cooking, improve dietary compliance, continue current medications, and continue current healthy lifestyle patterns.'    1. Vertigo  -     [facility] [place]  -     meclizine (ANTIVERT) 12.5 MG tablet; Take 1 tablet by mouth 3 times daily as needed for Dizziness, Disp-30 tablet, Shandra Concepcion MD, Neurology, [facility]  -     Comprehensive Metabolic Panel; Future  -     TSH with Reflex; Future  -     Vitamin B1; Future  -     Vitamin B6; Future  -     Vitamin B12 & Folate; Future  2. Downbeat nystagmus  -     Comprehensive Metabolic Panel; Future  -     TSH with Reflex; Future  -     Vitamin B1; Future  -     Vitamin B6; Future  -     Vitamin B12 & Folate; Future  3. At high risk for falls  -     [facility] [place]  -     meclizine (ANTIVERT) 12.5 MG tablet;  Take 1 tablet by mouth 3 times daily as needed for Dizziness, Disp-30 tablet, Shandra Goncalves MD, Neurology, [facility]  -     Comprehensive Metabolic Panel; Future  -     TSH with Reflex; Future  -     Vitamin B1; Future  -     Vitamin B6; Future  -     Vitamin B12 & Folate; Future  4. Arteriosclerotic coronary artery disease  -     Comprehensive Metabolic Panel; Future  -     TSH with Reflex; Future  -     Vitamin B1; Future  -     Vitamin B6; Future  -     Vitamin B12 & Folate; Future  5. Stage 3a chronic kidney disease ([facility] [address]  -     Comprehensive Metabolic Panel; Future  -     TSH with Reflex; Future  -     Vitamin B1; Future  -     Vitamin B6; Future  -     Vitamin B12 & Folate; Future  6. Acquired hypothyroidism  -     Comprehensive Metabolic Panel; Future  -     TSH with Reflex; Future  -     Vitamin B1; Future  -     Vitamin B6; Future  -     Vitamin B12 & Folate; Future  7. Essential hypertension    Return in about 3 months (around 4/20/2023) for Results review, Routine follow up. SUBJECTIVE/OBJECTIVE:  HPI  Here to establish care  Recent spiral fracture of left femur  Hx of vertigo for long term  Sees Dr Brenda Plascencia for cardiolgy  Has cg through the week  Vertigo for some time  Sees waves before the dizzy spell occurs  Then develops a headache  Has down gaze nystagmus    Review of Systems   Constitutional:  Negative for activity change, appetite change, diaphoresis, fatigue and fever. HENT:  Negative for congestion, facial swelling, postnasal drip and sore throat. Eyes:  Negative for photophobia, pain, discharge and visual disturbance. Respiratory:  Negative for apnea, chest tightness and shortness of breath. Cardiovascular:  Negative for chest pain and leg swelling. Gastrointestinal:  Negative for abdominal distention, constipation, diarrhea and nausea. Endocrine: Negative for cold intolerance. Genitourinary:  Negative for dysuria, flank pain, frequency and urgency. Musculoskeletal:  Negative for arthralgias, back pain and myalgias. Skin:  Negative for pallor and rash. Allergic/Immunologic: Negative for environmental allergies and food allergies. Neurological:  Negative for dizziness, weakness, numbness and headaches. Hematological:  Negative for adenopathy. Does not bruise/bleed easily. Psychiatric/Behavioral:  Negative for agitation, confusion and suicidal ideas. Physical Exam  Vitals and nursing note reviewed. Constitutional:       General: She is not in acute distress. Appearance: She is well-developed. She is obese. She is not ill-appearing. HENT:      Head: Normocephalic. Right Ear: Tympanic membrane and ear canal normal.      Left Ear: Tympanic membrane and ear canal normal.      Nose: Nose normal. No rhinorrhea. Eyes:      General: Lids are normal. Vision grossly intact. No scleral icterus. Right eye: No discharge. Left eye: No discharge. Extraocular Movements:      Right eye: Nystagmus present. Left eye: Nystagmus present. Conjunctiva/sclera: Conjunctivae normal.   Cardiovascular:      Rate and Rhythm: Normal rate and regular rhythm. Heart sounds: Normal heart sounds. Pulmonary:      Effort: Pulmonary effort is normal.      Breath sounds: Normal breath sounds. No wheezing or rales. Abdominal:      General: Bowel sounds are normal. There is no distension. Palpations: Abdomen is soft. Tenderness: There is no abdominal tenderness. Musculoskeletal:         General: No tenderness. Normal range of motion. Cervical back: Normal range of motion. No tenderness. Right lower leg: No edema. Left lower leg: No edema. Lymphadenopathy:      Cervical: No cervical adenopathy. Skin:     General: Skin is warm and dry. Findings: No erythema or rash. Neurological:      Mental Status: She is alert and oriented to person, place, and time. Cranial Nerves: No cranial nerve deficit. Motor: Weakness present.       Coordination: Coordination normal.      Gait: Gait abnormal.      Deep Tendon Reflexes: Reflexes normal.   Psychiatric:         Mood and Affect: Mood normal.         Behavior: Behavior normal.         Thought Content: Thought content normal.         Judgment: Judgment normal.               An electronic signature was used to authenticate this note. --CRISTIANE Lam - CNP On the basis of positive falls risk screening, assessment and plan is as follows: home safety tips provided, referral to physical therapy provided for strength and balance training, referral to neurology provided for dizzines, patient will follow up in 3 month(s) for further evaluation.

## 2023-01-22 LAB — PYRIDOXAL PHOS SERPL-SCNC: 11.2 NMOL/L (ref 20–125)

## 2023-01-24 ENCOUNTER — TELEPHONE (OUTPATIENT)
Dept: FAMILY MEDICINE CLINIC | Age: 88
End: 2023-01-24

## 2023-01-24 NOTE — TELEPHONE ENCOUNTER
----- Message from CRISTIANE Wick CNP sent at 1/24/2023 11:26 AM EST -----  Labs are stable except for deficiency in B6. Ordered replacement to be taken once per day. Would decrease B12 to every other day dosing . No other changes at this time.

## 2023-01-24 NOTE — TELEPHONE ENCOUNTER
I called and spoke to the patient's daughter, Corina Lopez. I let her know Dania's response regarding the patient's lab results. She voiced understanding.

## 2023-01-27 ENCOUNTER — TELEPHONE (OUTPATIENT)
Dept: FAMILY MEDICINE CLINIC | Age: 88
End: 2023-01-27

## 2023-01-27 NOTE — TELEPHONE ENCOUNTER
I called and spoke to Faviola Burrell. I let her know that it will be ok to wait until Monday to pick that prescription up. She voiced understanding.

## 2023-01-27 NOTE — TELEPHONE ENCOUNTER
I called and spoke to the patient's emergency contact, Kole Escalante. I let her know Dania's response regarding the patient's lab results. She voiced understanding and said that she is on her way to [place] right now and will not be back until Monday so she won't be able to pick it up for the patient until Monday. Will this be ok? If not, she will have someone  the prescription for her.

## 2023-01-27 NOTE — TELEPHONE ENCOUNTER
----- Message from CRISTIANE Lopez CNP sent at 1/27/2023 12:45 PM EST -----  Thiamine is low as well. Ordered thiamine supplementation for pt.

## 2023-02-06 ENCOUNTER — HOSPITAL ENCOUNTER (OUTPATIENT)
Dept: PHYSICAL THERAPY | Age: 88
Setting detail: THERAPIES SERIES
Discharge: HOME | End: 2023-02-06
Payer: MEDICARE

## 2023-02-06 PROCEDURE — 97530 THERAPEUTIC ACTIVITIES: CPT

## 2023-02-06 PROCEDURE — 97162 PT EVAL MOD COMPLEX 30 MIN: CPT

## 2023-02-06 NOTE — PROGRESS NOTES
** PLEASE SIGN, DATE AND TIME CERTIFICATION BELOW AND RETURN TO [facility] OUTPATIENT REHABILITATION (FAX #: [phone]). ATTEST/CO-SIGN IF ACCESSING VIA INEmployma. THANK YOU.**    I certify that I have examined the patient below and determined that Physical Medicine and Rehabilitation service is necessary and that I approve the established plan of care for up to 90 days or as specifically noted. Attestation, signature or co-signature of physician indicates approval of certification requirements.    ________________________ ____________ __________  Physician Signature   Date   Time  Yumiko Croft [address]  PHYSICAL THERAPY  [x] VESTIBULAR EVALUATION  [] DAILY NOTE [] PROGRESS NOTE [] DISCHARGE NOTE    [] [address] [facility]   [] [address]    [] [address] [facility]   [x] Torrey Marilee    Date: 2023  Patient Name:  Courtney Garcia  : 1935  MRN: [id number]  CSN: [id number]    Referring Practitioner Kayleigh Toussaint*   Diagnosis At high risk for falls [Z91.81]  Vertigo [R42]    Treatment Diagnosis Difficulty walking; imbalance    Date of Evaluation 23   Additional Pertinent History She has a history of 2 new hips and knees; HTN; vertigo; OA      Functional Outcome Measure Used [facility]   Functional Outcome Score 44/96 or 46% disability (23)       Insurance: Primary: Payor: Aristeo García /  /  / , $40 co-pay per visit  Secondary:    Authorization Information: Pre-certification    Visit # 1, 1/10 for progress note   Visits Allowed: Based on medical necessity   Recertification Date: April 3, 23   Physician Follow-Up:    Physician Orders:    History of Present Illness: Ishmael Parmar was diagnosed with down beating nystagmus years ago. She has been seen by various specialists and was referred to PT at one time to address her balance. This last , Sherice slipped off the toilet and she was told that she broke her left femur.  She underwent surgery on  at [facility] and was discharged to [facility] in [facility] for rehab. She was finally discharged home around December. She is currently living in a \"mother in law\" house at her daughters home in [facility]. She is able to walk with a walker, however she feel really \"dizzy\". Prior to recent fall, she was always dizzy. SUBJECTIVE: Aggravating factors include eye movement; head movement. Alleviating factors include closing her eyes; holding onto things. She has only thrown up one time and denies any changes in hearing. She does not associate increase in symptoms with loud noises or increase in intra-abdominal pressure. Social/Functional History and Current Status:  Medications and Allergies have been reviewed and are listed on Medical History Questionnaire. Magaly Davis lives with family in a single story home with a level surface to enter. Task Previous Current   ADLs  Independent Assistance Required   IADL's Independent Modified Independent   Ambulation Independent Assistance Required   Transfers Independent Modified Independent   Recreation Not Applicable Not Applicable   Community Integration Independent Independent   Driving Does not drive Does not drive   Work Retired  Retired       Precautions: DOWNBEATING NYSTAGMUS-CONSTANT    Pain Denies   Neuropathy: No   Hearing/Ear History No hearing aides    Vision Wears glasses and Current with vision exam   Fall history     Sense of being off balance. .. Symptoms of imbalanc worse with. ..  Complex Visual Environments: Yes  , Self-Motion: Yes  , and Difficulty Performing Precise Visual Tasks: Yes     Neck ROM Within range needed for vestibular testing   Vertebral Artery Testing Negative   Alar ligament test Not Tested   Functional LE Strength Not Tested   5X Sit to Stand Not Tested     Oculomotor/VOR  Oculomotor Testing (with fixation) Left Right Comments/Observations/Symptoms   Spontaneous Nystagmus (+) (+) Constant downbeat which lessens in intensity with upward gaze Smooth Pursuit (+) (+)    Saccades (+) (+)    Head Thrust (-) (+) Corrective saccade observed        Balance:  Modified CTSIB Time Sway Strategy   Level Surface - eyes open 30 none Feet are apart; arms by her side   Level Surface - eyes closed 30 min Feet are apart; arms by her side    Foam - eyes open 3 sec significant Falls to the left    Foam - eyes closed Not tested  Not tested           Positional BPPV Testing Left Right Duration Observations   Loaded [place] Negative  Negative      Roll Test Negative  Possible (+) < 60 seconds Reported the curtains were moving in a circular motion   Side-lying Test   Not Tested      Gait Score Fall Risk Observations   Dynamic Gait Index      Functional Gait Assessment        TREATMENT   Precautions:    Pain:     X in shaded column indicates activity completed today   Modalities Parameters/  Location  Notes                     Manual Therapy Time/Technique  Notes                     Exercise/Intervention   Notes   Vestibular Education:  x Provided handout on unilateral vestibular hypofunction; reviewed clinical findings with both Cori and her daughter; discussed prior vision therapy   Post Maneuver Precautions:                                                                       Specific Interventions Next Treatment: Initiate vestibular adaptation program with x1 viewing exercises; dynamic balance and gait activities     Activity/Treatment Tolerance:  [x]  Patient tolerated treatment well  []  Patient limited by fatigue  []  Patient limited by pain   []  Patient limited by medical complications  []  Other:     Assessment: Kate Remy is a romi 82 yr old woman referred to PT to address ongoing imbalance and dizziness. She was diagnosed with down beating nystagmus a number of years ago; she has had vision therapy, prisms in her glasses, never vestibular or physical therapy.  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 Further testing indicated significant LE weakness and gait deviations stemming from recent femur fracture. She will greatly benefit from PT to incorporate an adaptation program to address her hypofunction while also working on improving static and dynamic balance and gait activities to minimize fall risk. Body Structures/Functions/Activity Limitations:impaired activity tolerance, impaired balance, impaired endurance, impaired ROM, impaired safety awareness, impaired strength, and abnormal gait  Prognosis: fair    GOALS:  Patient Goal: resolve dizziness; improve balance     Short Term Goals:  Time Frame: 4 weeks  Cori will demonstrate and consistently use an ankle strategy to maintain standing balance with perturbations to her trunk to keep from falling. Cori's DHI will improve to <26 indicating clinically significant improvement in disability related to dizziness as her VOR hypofunction resolves. Ana Tello will perform 10 sit-stands with minimal verbal or manual cuing and minimal UE support as her strength and stabilization improves. Ana Tello will walk 500ft+ with FWW consistently allowing greater independence with community ambulation. Long Term Goals:  Time Frame: 8+ weeks  Ana Tello will be discharged from PT with independent HEP to maintain all gains achieved in therapy. Ana Tello will report 50% improvement in balance, wave like sensation and dizziness thereby providing greater confidence in her gait and decrease in feeling off balance. Cori's DHI will improve to <15 indicating minimal disability related to her dizziness. Patient Education:   [x]  HEP/Education Completed: Plan of Care, Goals, Unilateral VOR hypofunction  [facility] Access Code:  []  No new Education completed  []  Reviewed Prior HEP      [x]  Patient verbalized and/or demonstrated understanding of education provided. []  Patient unable to verbalize and/or demonstrate understanding of education provided.   Will continue education. [x]  Barriers to learning: n/a    PLAN:  Treatment Recommendations: Strengthening, Range of Motion, Balance Training, Functional Mobility Training, Transfer Training, Endurance Training, Gait Training, Stair Training, Neuromuscular Re-education, Manual Therapy - Soft Tissue Mobilization, Manual Therapy - Joint Manipulation, Pain Management, Home Exercise Program, Patient Education, Vestibular Rehabilitation, and Modalities    [x]  Plan of care initiated. Plan to see patient 2 times per week for 8 weeks to address the treatment planned outlined above.   []  Continue with current plan of care  []  Modify plan of care as follows:    []  Hold pending physician visit  []  Discharge    Time In 1455   Time Out 1610   Timed Code Minutes: 20 min   Total Treatment Time: 75 min       Electronically Signed by: JULIETH Sheets [id number]"Jamaal Christopher DPT  [id number]

## 2023-02-10 ENCOUNTER — HOSPITAL ENCOUNTER (OUTPATIENT)
Dept: PHYSICAL THERAPY | Age: 88
Setting detail: THERAPIES SERIES
Discharge: HOME | End: 2023-02-10
Payer: MEDICARE

## 2023-02-10 PROCEDURE — 97530 THERAPEUTIC ACTIVITIES: CPT

## 2023-02-10 NOTE — PROGRESS NOTES
Yumiko Croft 62  PHYSICAL THERAPY  [] VESTIBULAR EVALUATION  [x] DAILY NOTE [] PROGRESS NOTE [] DISCHARGE NOTE    [] [facility] [place]   [] [address]    [] [address] [facility]   [x] Tracy Night    Date: 2/10/2023  Patient Name:  Chase Zuleta  : 1935  MRN: [id number]  CSN: [id number]    Referring Practitioner Yossi Fisher*   Diagnosis At high risk for falls [Z91.81]  Vertigo [R42]    Treatment Diagnosis Difficulty walking; imbalance    Date of Evaluation 23   Additional Pertinent History She has a history of 2 new hips and knees; HTN; vertigo; OA      Functional Outcome Measure Used [facility]   Functional Outcome Score 44/96 or 46% disability (23)       Insurance: Primary: Payor: Tobi Glass /  /  / , $40 co-pay per visit  Secondary:    Authorization Information: Pre-certification    Visit # 2, 2/10 for progress note   Visits Allowed: Based on medical necessity   Recertification Date: April 3, 23   Physician Follow-Up:    Physician Orders:    History of Present Illness: William Garcia was diagnosed with down beating nystagmus years ago. She has been seen by various specialists and was referred to PT at one time to address her balance. This last , Sherice slipped off the toilet and she was told that she broke her left femur. She underwent surgery on  at [facility] and was discharged to [facility] in [facility] for rehab. She was finally discharged home around December. She is currently living in a \"mother in law\" house at her daughters home in [facility] [place]. She is able to walk with a walker, however she feel really \"dizzy\". Prior to recent fall, she was always dizzy.       SUBJECTIVE: Woke up with a headache and she felt extra dizzy and weak    Oculomotor/VOR  Oculomotor Testing (with fixation) Left Right Comments/Observations/Symptoms   Spontaneous Nystagmus (+) (+) Constant downbeat which lessens in intensity with upward gaze    Smooth Pursuit (+) (+) Saccades (+) (+)    Head Thrust (-) (+) Corrective saccade observed        Balance:  Modified CTSIB Time Sway Strategy   Level Surface - eyes open 30 none Feet are apart; arms by her side   Level Surface - eyes closed 30 min Feet are apart; arms by her side    Foam - eyes open 3 sec significant Falls to the left    Foam - eyes closed Not tested  Not tested           Positional BPPV Testing Left Right Duration Observations   Loaded Holden-Hallpike Negative  Negative      Roll Test Negative  Possible (+) < 60 seconds Reported the curtains were moving in a circular motion   Side-lying Test   Not Tested      Gait Score Fall Risk Observations   Dynamic Gait Index      Functional Gait Assessment        TREATMENT   Precautions:    Pain:     X in shaded column indicates activity completed today   Modalities Parameters/  Location  Notes                     Manual Therapy Time/Technique  Notes                     Exercise/Intervention   Notes   Vestibular Education:   Provided handout on unilateral vestibular hypofunction; reviewed clinical findings with both Cori and her daughter; discussed prior vision therapy   Post Maneuver Precautions:      Review of HEP; vision therapy handouts    x    Sit-stand 5x  x    Gait training with FWW 2x50 ft  x    // bars: forward march; side step 2x ea  x    Anterior/posterior wt shifts 10x  x                                  Specific Interventions Next Treatment: Initiate vestibular adaptation program with x1 viewing exercises; dynamic balance and gait activities     Activity/Treatment Tolerance:  [x]  Patient tolerated treatment well  []  Patient limited by fatigue  []  Patient limited by pain   []  Patient limited by medical complications  []  Other:     Assessment: Initiated dynamic strengthening and gait program today. Will work on getting her stronger before we focus on vestibular exercises.       Body Structures/Functions/Activity Limitations:impaired activity tolerance, impaired balance, impaired endurance, impaired ROM, impaired safety awareness, impaired strength, and abnormal gait  Prognosis: fair    GOALS:  Patient Goal: resolve dizziness; improve balance     Short Term Goals:  Time Frame: 4 weeks  Cori will demonstrate and consistently use an ankle strategy to maintain standing balance with perturbations to her trunk to keep from falling. Cori's DHI will improve to <26 indicating clinically significant improvement in disability related to dizziness as her VOR hypofunction resolves. Shahriar Johns will perform 10 sit-stands with minimal verbal or manual cuing and minimal UE support as her strength and stabilization improves. Shahriar Johns will walk 500ft+ with FWW consistently allowing greater independence with community ambulation. Long Term Goals:  Time Frame: 8+ weeks  Shahriar Johns will be discharged from PT with independent HEP to maintain all gains achieved in therapy. Shahriar Johns will report 50% improvement in balance, wave like sensation and dizziness thereby providing greater confidence in her gait and decrease in feeling off balance. Vanesas DHI will improve to <15 indicating minimal disability related to her dizziness. Patient Education:   [x]  HEP/Education Completed: Plan of Care, Goals, Unilateral VOR hypofunction  [facility] Access Code:  []  No new Education completed  []  Reviewed Prior HEP      [x]  Patient verbalized and/or demonstrated understanding of education provided. []  Patient unable to verbalize and/or demonstrate understanding of education provided. Will continue education.   [x]  Barriers to learning: n/a    PLAN:  Treatment Recommendations: Strengthening, Range of Motion, Balance Training, Functional Mobility Training, Transfer Training, Endurance Training, Gait Training, Stair Training, Neuromuscular Re-education, Manual Therapy - Soft Tissue Mobilization, Manual Therapy - Joint Manipulation, Pain Management, Home Exercise Program, Patient Education, Vestibular Rehabilitation, and Modalities    []  Plan of care initiated. Plan to see patient 2 times per week for 8 weeks to address the treatment planned outlined above.   [x]  Continue with current plan of care  []  Modify plan of care as follows:    []  Hold pending physician visit  []  Discharge    Time In 1139   Time Out 1215   Timed Code Minutes: 36 min   Total Treatment Time: 36 min       Electronically Signed by: Surya Cedeño, JULIETH Burns [id number]"Abdirashid Hernadez DPT  [id number]

## 2023-02-14 ENCOUNTER — HOSPITAL ENCOUNTER (OUTPATIENT)
Dept: PHYSICAL THERAPY | Age: 88
Setting detail: THERAPIES SERIES
Discharge: HOME | End: 2023-02-14
Payer: MEDICARE

## 2023-02-14 PROCEDURE — 97530 THERAPEUTIC ACTIVITIES: CPT

## 2023-02-14 PROCEDURE — 97110 THERAPEUTIC EXERCISES: CPT

## 2023-02-14 NOTE — PROGRESS NOTES
Yumiko Croft 62  PHYSICAL THERAPY  [] VESTIBULAR EVALUATION  [x] DAILY NOTE [] PROGRESS NOTE [] DISCHARGE NOTE    [] [facility] [place]   [] [address]    [] [address] [employer]   [x] Arch Nearing    Date: 2023  Patient Name:  Camilla Goel  : 1935  MRN: [id number]  CSN: [id number]    Referring Practitioner Bo Catalan*   Diagnosis At high risk for falls [Z91.81]  Vertigo [R42]    Treatment Diagnosis Difficulty walking; imbalance    Date of Evaluation 23   Additional Pertinent History She has a history of 2 new hips and knees; HTN; vertigo; OA      Functional Outcome Measure Used DHI   Functional Outcome Score 44/96 or 46% disability (23)       Insurance: Primary: Payor: Dayami Enriquez /  /  / , $40 co-pay per visit  Secondary:    Authorization Information: Pre-certification    Visit # 3, 3/10 for progress note   Visits Allowed: Based on medical necessity   Recertification Date: April 3, 23   Physician Follow-Up:    Physician Orders:    History of Present Illness: Emmie Pringle was diagnosed with down beating nystagmus years ago. She has been seen by various specialists and was referred to PT at one time to address her balance. This last , Sherice slipped off the toilet and she was told that she broke her left femur. She underwent surgery on  at [address] [place] [address] and was discharged to [facility] in [facility] for rehab. She was finally discharged home around December. She is currently living in a \"mother in law\" house at her daughters home in [facility]. She is able to walk with a walker, however she feel really \"dizzy\". Prior to recent fall, she was always dizzy.       SUBJECTIVE: Has been struggling with more dizziness lately; she checked her BP    Oculomotor/VOR  Oculomotor Testing (with fixation) Left Right Comments/Observations/Symptoms   Spontaneous Nystagmus (+) (+) Constant downbeat which lessens in intensity with upward gaze    Smooth Pursuit (+) (+)    Saccades (+) (+)    Head Thrust (-) (+) Corrective saccade observed        Balance:  Modified CTSIB Time Sway Strategy   Level Surface - eyes open 30 none Feet are apart; arms by her side   Level Surface - eyes closed 30 min Feet are apart; arms by her side    Foam - eyes open 3 sec significant Falls to the left    Foam - eyes closed Not tested  Not tested           Positional BPPV Testing Left Right Duration Observations   Loaded [place]-Hallpike Negative  Negative      Roll Test Negative  Possible (+) < 60 seconds Reported the curtains were moving in a circular motion   Side-lying Test   Not Tested      Gait Score Fall Risk Observations   Dynamic Gait Index      Functional Gait Assessment        TREATMENT   Precautions:    Pain:     X in shaded column indicates activity completed today   Modalities Parameters/  Location  Notes                     Manual Therapy Time/Technique  Notes                     Exercise/Intervention   Notes   Vestibular Education:   Provided handout on unilateral vestibular hypofunction; reviewed clinical findings with both Cori and her daughter; discussed prior vision therapy   Post Maneuver Precautions:      Review of HEP; vision therapy handouts        Sit-stand 10x  x    Gait training with FWW 2x50 ft  x    // bars: forward march; side step 2x ea  x    Anterior/posterior wt shifts; marches on Airex 10x  x    Doreen: forward step over and back 10x ea. x Cuing to lead with heel first    Seated LAQ and hip flexion with abd 10x ea. x    Seated DF 10x ea.   x             Specific Interventions Next Treatment: Initiate vestibular adaptation program with x1 viewing exercises; dynamic balance and gait activities     Activity/Treatment Tolerance:  [x]  Patient tolerated treatment well  []  Patient limited by fatigue  []  Patient limited by pain   []  Patient limited by medical complications  []  Other:     Assessment: Continued to work on foot clearance, active hip flexion and balance strategies. Tends to catch her toes, however when cued to step over and lead with heel she cleared the vargas with greater ease. Body Structures/Functions/Activity Limitations:impaired activity tolerance, impaired balance, impaired endurance, impaired ROM, impaired safety awareness, impaired strength, and abnormal gait  Prognosis: fair    GOALS:  Patient Goal: resolve dizziness; improve balance     Short Term Goals:  Time Frame: 4 weeks  Cori will demonstrate and consistently use an ankle strategy to maintain standing balance with perturbations to her trunk to keep from falling. Vanesas DHI will improve to <26 indicating clinically significant improvement in disability related to dizziness as her VOR hypofunction resolves. Lorrie Gordon will perform 10 sit-stands with minimal verbal or manual cuing and minimal UE support as her strength and stabilization improves. Lorrie Gordon will walk 500ft+ with FWW consistently allowing greater independence with community ambulation. Long Term Goals:  Time Frame: 8+ weeks  Lorrie Gordon will be discharged from PT with independent HEP to maintain all gains achieved in therapy. Lorrie Gordon will report 50% improvement in balance, wave like sensation and dizziness thereby providing greater confidence in her gait and decrease in feeling off balance. Vanesas DHI will improve to <15 indicating minimal disability related to her dizziness. Patient Education:   [x]  HEP/Education Completed: Plan of Care, Goals, Unilateral VOR hypofunction  [facility] Access Code:  []  No new Education completed  []  Reviewed Prior HEP      [x]  Patient verbalized and/or demonstrated understanding of education provided. []  Patient unable to verbalize and/or demonstrate understanding of education provided. Will continue education.   [x]  Barriers to learning: n/a    PLAN:  Treatment Recommendations: Strengthening, Range of Motion, Balance Training, Functional Mobility Training, Transfer Training, Endurance Training, Gait Training, Stair Training, Neuromuscular Re-education, Manual Therapy - Soft Tissue Mobilization, Manual Therapy - Joint Manipulation, Pain Management, Home Exercise Program, Patient Education, Vestibular Rehabilitation, and Modalities    []  Plan of care initiated. Plan to see patient 2 times per week for 8 weeks to address the treatment planned outlined above.   [x]  Continue with current plan of care  []  Modify plan of care as follows:    []  Hold pending physician visit  []  Discharge    Time In 1428   Time Out 1502   Timed Code Minutes: 34 min   Total Treatment Time: 34 min       Electronically Signed by: Amy Serna, PT   Brigido Burns [id number]" Fritz Schroeder DPT  [id number]

## 2023-02-16 ENCOUNTER — HOSPITAL ENCOUNTER (OUTPATIENT)
Dept: PHYSICAL THERAPY | Age: 88
Setting detail: THERAPIES SERIES
Discharge: HOME | End: 2023-02-16
Payer: MEDICARE

## 2023-02-16 PROCEDURE — 97110 THERAPEUTIC EXERCISES: CPT

## 2023-02-16 PROCEDURE — 97112 NEUROMUSCULAR REEDUCATION: CPT

## 2023-02-16 PROCEDURE — 97530 THERAPEUTIC ACTIVITIES: CPT

## 2023-02-16 NOTE — PROGRESS NOTES
Yumiko Croft [address]  PHYSICAL THERAPY  [] VESTIBULAR EVALUATION  [x] DAILY NOTE [] PROGRESS NOTE [] DISCHARGE NOTE    [] [facility] [place]   [] [address]    [] [address] [place]   [x] Reyes Even    Date: 2023  Patient Name:  Tu Soria  : 1935  MRN: [id number]  CSN: [id number]    Referring Practitioner Radha De Dios*   Diagnosis At high risk for falls [Z91.81]  Vertigo [R42]    Treatment Diagnosis Difficulty walking; imbalance    Date of Evaluation 23   Additional Pertinent History She has a history of 2 new hips and knees; HTN; vertigo; OA      Functional Outcome Measure Used DHI   Functional Outcome Score 44/96 or 46% disability (23)       Insurance: Primary: Payor: Raquel Gutierrez /  /  / , $40 co-pay per visit  Secondary:    Authorization Information: Pre-certification    Visit # 4, 4/10 for progress note   Visits Allowed: Based on medical necessity   Recertification Date: April 3, 23   Physician Follow-Up:    Physician Orders:    History of Present Illness: Xiang Ac was diagnosed with down beating nystagmus years ago. She has been seen by various specialists and was referred to PT at one time to address her balance. This last , Sherice slipped off the toilet and she was told that she broke her left femur. She underwent surgery on  at [facility] and was discharged to [facility] in [facility] for rehab. She was finally discharged home around December. She is currently living in a \"mother in law\" house at her daughters home in [facility]. She is able to walk with a walker, however she feel really \"dizzy\". Prior to recent fall, she was always dizzy. SUBJECTIVE: Having a good day; good attitude, doing her exercises!     Oculomotor/VOR  Oculomotor Testing (with fixation) Left Right Comments/Observations/Symptoms   Spontaneous Nystagmus (+) (+) Constant downbeat which lessens in intensity with upward gaze    Smooth Pursuit (+) (+) Saccades (+) (+)    Head Thrust (-) (+) Corrective saccade observed        Balance:  Modified CTSIB Time Sway Strategy   Level Surface - eyes open 30 none Feet are apart; arms by her side   Level Surface - eyes closed 30 min Feet are apart; arms by her side    Foam - eyes open 3 sec significant Falls to the left    Foam - eyes closed Not tested  Not tested           Positional BPPV Testing Left Right Duration Observations   Loaded [place]en-Hallpike Negative  Negative      Roll Test Negative  Possible (+) < 60 seconds Reported the curtains were moving in a circular motion   Side-lying Test   Not Tested      Gait Score Fall Risk Observations   Dynamic Gait Index      Functional Gait Assessment        TREATMENT   Precautions:    Pain:     X in shaded column indicates activity completed today   Modalities Parameters/  Location  Notes                     Manual Therapy Time/Technique  Notes                     Exercise/Intervention   Notes   Vestibular Education:   Provided handout on unilateral vestibular hypofunction; reviewed clinical findings with both Cori and her daughter; discussed prior vision therapy   Post Maneuver Precautions:      Review of HEP; vision therapy handouts        Sit-stand 10x  x    Gait training with FWW 2x50 ft  x    // bars: forward march; side step 2x ea  x    Anterior/posterior wt shifts; marches on Airex 10x  x    Doreen: forward step over and back 10x ea. Cuing to lead with heel first    Seated LAQ and hip flexion with abd 10x ea. x    Seated DF 10x ea.   x    Seated hip abd/add   x    NuStep 5 min Level 4 x    Alternating step tap (4 inch step) 10x  x    Airex: standing balance without UE support  2x15 sec  x    Standing with head turns (horizontal and vertical) 10x  x             Specific Interventions Next Treatment: Initiate vestibular adaptation program with x1 viewing exercises; dynamic balance and gait activities     Activity/Treatment Tolerance:  [x]  Patient tolerated treatment well  []  Patient limited by fatigue  []  Patient limited by pain   []  Patient limited by medical complications  []  Other:     Assessment: Added horizontal and vertical head turns while standing; struggled with rotations left. Body Structures/Functions/Activity Limitations:impaired activity tolerance, impaired balance, impaired endurance, impaired ROM, impaired safety awareness, impaired strength, and abnormal gait  Prognosis: fair    GOALS:  Patient Goal: resolve dizziness; improve balance     Short Term Goals:  Time Frame: 4 weeks  Cori will demonstrate and consistently use an ankle strategy to maintain standing balance with perturbations to her trunk to keep from falling. Cori's DHI will improve to <26 indicating clinically significant improvement in disability related to dizziness as her VOR hypofunction resolves. Matt Marquez will perform 10 sit-stands with minimal verbal or manual cuing and minimal UE support as her strength and stabilization improves. Matt Marquez will walk 500ft+ with FWW consistently allowing greater independence with community ambulation. Long Term Goals:  Time Frame: 8+ weeks  Matt Marquez will be discharged from PT with independent HEP to maintain all gains achieved in therapy. Matt Marquez will report 50% improvement in balance, wave like sensation and dizziness thereby providing greater confidence in her gait and decrease in feeling off balance. Vanesas DHI will improve to <15 indicating minimal disability related to her dizziness. Patient Education:   [x]  HEP/Education Completed: Plan of Care, Goals, Unilateral VOR hypofunction  [facility] Access Code:  []  No new Education completed  []  Reviewed Prior HEP      [x]  Patient verbalized and/or demonstrated understanding of education provided. []  Patient unable to verbalize and/or demonstrate understanding of education provided. Will continue education.   [x]  Barriers to learning: n/a    PLAN:  Treatment Recommendations: Strengthening, Range of Motion, Balance Training, Functional Mobility Training, Transfer Training, Endurance Training, Gait Training, Stair Training, Neuromuscular Re-education, Manual Therapy - Soft Tissue Mobilization, Manual Therapy - Joint Manipulation, Pain Management, Home Exercise Program, Patient Education, Vestibular Rehabilitation, and Modalities    []  Plan of care initiated. Plan to see patient 2 times per week for 8 weeks to address the treatment planned outlined above.   [x]  Continue with current plan of care  []  Modify plan of care as follows:    []  Hold pending physician visit  []  Discharge    Time In 1406   Time Out 1452   Timed Code Minutes: 44 min   Total Treatment Time: 44 min       Electronically Signed by: JULIETH Dickens [id number]" Lucia Lara DPT  [id number]

## 2023-02-21 ENCOUNTER — HOSPITAL ENCOUNTER (OUTPATIENT)
Dept: PHYSICAL THERAPY | Age: 88
Setting detail: THERAPIES SERIES
Discharge: HOME | End: 2023-02-21
Payer: MEDICARE

## 2023-02-21 PROCEDURE — 97530 THERAPEUTIC ACTIVITIES: CPT

## 2023-02-21 PROCEDURE — 97112 NEUROMUSCULAR REEDUCATION: CPT

## 2023-02-21 NOTE — PROGRESS NOTES
Yumiko Croft 62  PHYSICAL THERAPY  [] VESTIBULAR EVALUATION  [x] DAILY NOTE [] PROGRESS NOTE [] DISCHARGE NOTE    [] [facility] [place]   [] [address]    [] [address] [employer]   [x] Alondra Garcia    Date: 2023  Patient Name:  Ashanti Sherman  : 1935  MRN: [id number]  CSN: [id number]    Referring Practitioner Lynette Farr*   Diagnosis At high risk for falls [Z91.81]  Vertigo [R42]    Treatment Diagnosis Difficulty walking; imbalance    Date of Evaluation 23   Additional Pertinent History She has a history of 2 new hips and knees; HTN; vertigo; OA      Functional Outcome Measure Used DHI   Functional Outcome Score 44/96 or 46% disability (23)       Insurance: Primary: Payor: Ruthy Granados /  /  / , $40 co-pay per visit  Secondary:    Authorization Information: Pre-certification    Visit # 5, /10 for progress note   Visits Allowed: Based on medical necessity   Recertification Date: April 3, 23   Physician Follow-Up:    Physician Orders:    History of Present Illness: Lavell Ayala was diagnosed with down beating nystagmus years ago. She has been seen by various specialists and was referred to PT at one time to address her balance. This last , Sherice slipped off the toilet and she was told that she broke her left femur. She underwent surgery on  at [facility] and was discharged to [facility] in [place] for rehab. She was finally discharged home around December. She is currently living in a \"mother in law\" house at her daughters home in [place]. She is able to walk with a walker, however she feel really \"dizzy\". Prior to recent fall, she was always dizzy. SUBJECTIVE: Daughter woke her up with a start to come today!  Tired today    Oculomotor/VOR  Oculomotor Testing (with fixation) Left Right Comments/Observations/Symptoms   Spontaneous Nystagmus (+) (+) Constant downbeat which lessens in intensity with upward gaze    Smooth Pursuit (+) (+) Saccades (+) (+)    Head Thrust (-) (+) Corrective saccade observed        Balance:  Modified CTSIB Time Sway Strategy   Level Surface - eyes open 30 none Feet are apart; arms by her side   Level Surface - eyes closed 30 min Feet are apart; arms by her side    Foam - eyes open 3 sec significant Falls to the left    Foam - eyes closed Not tested  Not tested           Positional BPPV Testing Left Right Duration Observations   Loaded Holden-Hallpike Negative  Negative      Roll Test Negative  Possible (+) < 60 seconds Reported the curtains were moving in a circular motion   Side-lying Test   Not Tested      Gait Score Fall Risk Observations   Dynamic Gait Index      Functional Gait Assessment        TREATMENT   Precautions:    Pain:     X in shaded column indicates activity completed today   Modalities Parameters/  Location  Notes                     Manual Therapy Time/Technique  Notes                     Exercise/Intervention   Notes   Vestibular Education:   Provided handout on unilateral vestibular hypofunction; reviewed clinical findings with both Cori and her daughter; discussed prior vision therapy   Post Maneuver Precautions:      Review of HEP; vision therapy handouts        Sit-stand 10x      Gait training with FWW 2x50 ft      // bars: forward march; side step 2x ea  x    Anterior/posterior wt shifts; marches on Airex 10x  x    Doreen: forward step over and back 10x ea. x Cuing to lead with heel first    Seated LAQ and hip flexion with abd 10x ea. x    Seated DF 10x ea.   x    Seated hip abd/add   x    NuStep 5 min Level 4     Alternating step tap (4 inch step) 10x      Airex: standing balance without UE support  2x15 sec      Standing with head turns (horizontal and vertical) 10x  x    Walking with  ft with 1 rest break  x      Specific Interventions Next Treatment: Initiate vestibular adaptation program with x1 viewing exercises; dynamic balance and gait activities     Activity/Treatment Tolerance:  [x]  Patient tolerated treatment well  []  Patient limited by fatigue  []  Patient limited by pain   []  Patient limited by medical complications  []  Other:     Assessment: Continues to struggle with single leg balance activities with loading through her left leg due to pain and weakness. Today she walked 152 ft with FWW and 1 rest break! Body Structures/Functions/Activity Limitations:impaired activity tolerance, impaired balance, impaired endurance, impaired ROM, impaired safety awareness, impaired strength, and abnormal gait  Prognosis: fair    GOALS:  Patient Goal: resolve dizziness; improve balance     Short Term Goals:  Time Frame: 4 weeks  Cori will demonstrate and consistently use an ankle strategy to maintain standing balance with perturbations to her trunk to keep from falling. Vanesas DHI will improve to <26 indicating clinically significant improvement in disability related to dizziness as her VOR hypofunction resolves. Armidna Whalen will perform 10 sit-stands with minimal verbal or manual cuing and minimal UE support as her strength and stabilization improves. Arminda Whalen will walk 500ft+ with FWW consistently allowing greater independence with community ambulation. Long Term Goals:  Time Frame: 8+ weeks  Arminda Whalen will be discharged from PT with independent HEP to maintain all gains achieved in therapy. Arminda Whalen will report 50% improvement in balance, wave like sensation and dizziness thereby providing greater confidence in her gait and decrease in feeling off balance. Vanesas DHI will improve to <15 indicating minimal disability related to her dizziness. Patient Education:   [x]  HEP/Education Completed: Plan of Care, Goals, Unilateral VOR hypofunction  [facility]:  []  No new Education completed  []  Reviewed Prior HEP      [x]  Patient verbalized and/or demonstrated understanding of education provided.   []  Patient unable to verbalize and/or demonstrate understanding of education provided. Will continue education. [x]  Barriers to learning: n/a    PLAN:  Treatment Recommendations: Strengthening, Range of Motion, Balance Training, Functional Mobility Training, Transfer Training, Endurance Training, Gait Training, Stair Training, Neuromuscular Re-education, Manual Therapy - Soft Tissue Mobilization, Manual Therapy - Joint Manipulation, Pain Management, Home Exercise Program, Patient Education, Vestibular Rehabilitation, and Modalities    []  Plan of care initiated. Plan to see patient 2 times per week for 8 weeks to address the treatment planned outlined above.   [x]  Continue with current plan of care  []  Modify plan of care as follows:    []  Hold pending physician visit  []  Discharge    Time In 1530   Time Out 1610   Timed Code Minutes: 40 min   Total Treatment Time: 40 min       Electronically Signed by: JULIETH [address]" Taisha Nger, DPT  [id number]

## 2023-02-24 ENCOUNTER — HOSPITAL ENCOUNTER (OUTPATIENT)
Dept: PHYSICAL THERAPY | Age: 88
Setting detail: THERAPIES SERIES
Discharge: HOME | End: 2023-02-24
Payer: MEDICARE

## 2023-02-24 PROCEDURE — 97110 THERAPEUTIC EXERCISES: CPT

## 2023-02-24 PROCEDURE — 97112 NEUROMUSCULAR REEDUCATION: CPT

## 2023-02-24 PROCEDURE — 97530 THERAPEUTIC ACTIVITIES: CPT

## 2023-02-24 NOTE — PROGRESS NOTES
Yum[address]ft 62  PHYSICAL THERAPY  [] VESTIBULAR EVALUATION  [x] DAILY NOTE [] PROGRESS NOTE [] DISCHARGE NOTE    [] [facility] [place]   [] [address]    [] [address] [place]   [x] Ciaran Show    Date: 2023  Patient Name:  Tr Ferris  : 1935  MRN: [id number]  CSN: [id number]    Referring Practitioner Nannette Webster*   Diagnosis At high risk for falls [Z91.81]  Vertigo [R42]    Treatment Diagnosis Difficulty walking; imbalance    Date of Evaluation 23   Additional Pertinent History She has a history of 2 new hips and knees; HTN; vertigo; OA      Functional Outcome Measure Used DHI   Functional Outcome Score 44/96 or 46% disability (23)       Insurance: Primary: Payor: Bam Trujillo /  /  / , $40 co-pay per visit  Secondary:    Authorization Information: Pre-certification    Visit # 6, 6/10 for progress note   Visits Allowed: Based on medical necessity   Recertification Date: April 3, 23   Physician Follow-Up:    Physician Orders:    History of Present Illness: Sammi Mayo was diagnosed with down beating nystagmus years ago. She has been seen by various specialists and was referred to PT at one time to address her balance. This last , Sherice slipped off the toilet and she was told that she broke her left femur. She underwent surgery on  at [facility] and was discharged to [facility] in [facility] for rehab. She was finally discharged home around December. She is currently living in a \"mother in law\" house at her daughters home in [place]. She is able to walk with a walker, however she feel really \"dizzy\". Prior to recent fall, she was always dizzy.       SUBJECTIVE: In a great mood today; really enjoys coming to therapy    Oculomotor/VOR  Oculomotor Testing (with fixation) Left Right Comments/Observations/Symptoms   Spontaneous Nystagmus (+) (+) Constant downbeat which lessens in intensity with upward gaze    Smooth Pursuit (+) (+) Saccades (+) (+)    Head Thrust (-) (+) Corrective saccade observed        Balance:  Modified CTSIB Time Sway Strategy   Level Surface - eyes open 30 none Feet are apart; arms by her side   Level Surface - eyes closed 30 min Feet are apart; arms by her side    Foam - eyes open 3 sec significant Falls to the left    Foam - eyes closed Not tested  Not tested           Positional BPPV Testing Left Right Duration Observations   Loaded Holden-Hallpike Negative  Negative      Roll Test Negative  Possible (+) < 60 seconds Reported the curtains were moving in a circular motion   Side-lying Test   Not Tested      Gait Score Fall Risk Observations   Dynamic Gait Index      Functional Gait Assessment        TREATMENT   Precautions:    Pain:     X in shaded column indicates activity completed today   Modalities Parameters/  Location  Notes                     Manual Therapy Time/Technique  Notes                     Exercise/Intervention   Notes   Vestibular Education:   Provided handout on unilateral vestibular hypofunction; reviewed clinical findings with both Cori and her daughter; discussed prior vision therapy   Post Maneuver Precautions:      Review of HEP; vision therapy handouts        Sit-stand 10x      Gait training with FWW 2x50 ft      // bars: forward march; side step 2x ea  x    Anterior/posterior wt shifts; marches on Airex 10x  x    Doreen: forward step over and back 10x ea. Cuing to lead with heel first    Seated LAQ and hip flexion with abd 10x ea. x    Seated DF 10x ea.   x    Seated hip abd/add   x    NuStep 5 min Level 4     Alternating step tap (4 inch step) 10x      Airex: standing balance without UE support  2x15 sec      Standing with head turns (horizontal and vertical) 10x  x                  Walking with FWW with head turns every 3 steps 1x50 ft  x Cuing needed to continue walking   Walking with  ft with 1 rest break  x      Specific Interventions Next Treatment: Initiate vestibular adaptation program with x1 viewing exercises; dynamic balance and gait activities     Activity/Treatment Tolerance:  [x]  Patient tolerated treatment well  []  Patient limited by fatigue  []  Patient limited by pain   []  Patient limited by medical complications  []  Other:     Assessment: Edgardo Millard was very chatty today! Progressed her TE by adding horizontal head turns with walking every step. Body Structures/Functions/Activity Limitations:impaired activity tolerance, impaired balance, impaired endurance, impaired ROM, impaired safety awareness, impaired strength, and abnormal gait  Prognosis: fair    GOALS:  Patient Goal: resolve dizziness; improve balance     Short Term Goals:  Time Frame: 4 weeks  Cori will demonstrate and consistently use an ankle strategy to maintain standing balance with perturbations to her trunk to keep from falling. Vanesas DHI will improve to <26 indicating clinically significant improvement in disability related to dizziness as her VOR hypofunction resolves. Edgardo Millard will perform 10 sit-stands with minimal verbal or manual cuing and minimal UE support as her strength and stabilization improves. Edgardo Millard will walk 500ft+ with FWW consistently allowing greater independence with community ambulation. Long Term Goals:  Time Frame: 8+ weeks  Edgardo Millard will be discharged from PT with independent HEP to maintain all gains achieved in therapy. Edgardo Millard will report 50% improvement in balance, wave like sensation and dizziness thereby providing greater confidence in her gait and decrease in feeling off balance. Vanesas DHI will improve to <15 indicating minimal disability related to her dizziness. Patient Education:   [x]  HEP/Education Completed: Plan of Care, Goals, Unilateral VOR hypofunction  [facility] Access Code:  []  No new Education completed  []  Reviewed Prior HEP      [x]  Patient verbalized and/or demonstrated understanding of education provided.   []  Patient unable to verbalize and/or demonstrate understanding of education provided. Will continue education. [x]  Barriers to learning: n/a    PLAN:  Treatment Recommendations: Strengthening, Range of Motion, Balance Training, Functional Mobility Training, Transfer Training, Endurance Training, Gait Training, Stair Training, Neuromuscular Re-education, Manual Therapy - Soft Tissue Mobilization, Manual Therapy - Joint Manipulation, Pain Management, Home Exercise Program, Patient Education, Vestibular Rehabilitation, and Modalities    []  Plan of care initiated. Plan to see patient 2 times per week for 8 weeks to address the treatment planned outlined above.   [x]  Continue with current plan of care  []  Modify plan of care as follows:    []  Hold pending physician visit  []  Discharge    Time In 1430   Time Out 1510   Timed Code Minutes: 40 min   Total Treatment Time: 40 min       Electronically Signed by: JULIETH Jose 7007" Serg Ye DPT  [id number]

## 2023-03-01 ENCOUNTER — HOSPITAL ENCOUNTER (OUTPATIENT)
Dept: PHYSICAL THERAPY | Age: 88
Setting detail: THERAPIES SERIES
Discharge: HOME | End: 2023-03-01
Payer: MEDICARE

## 2023-03-01 PROCEDURE — 97110 THERAPEUTIC EXERCISES: CPT

## 2023-03-01 PROCEDURE — 97112 NEUROMUSCULAR REEDUCATION: CPT

## 2023-03-01 NOTE — PROGRESS NOTES
Yumiko Croft 62  PHYSICAL THERAPY  [] VESTIBULAR EVALUATION  [x] DAILY NOTE [] PROGRESS NOTE [] DISCHARGE NOTE    [] [facility] [place]   [] [address]    [] [address] [employer]   [x] [place]    Date: 3/1/2023  Patient Name:  Magaly Davis  : 1935  MRN: [id number]  CSN: [id number]    Referring Practitioner Quan Sun*   Diagnosis At high risk for falls [Z91.81]  Vertigo [R42]    Treatment Diagnosis Difficulty walking; imbalance    Date of Evaluation 23   Additional Pertinent History She has a history of 2 new hips and knees; HTN; vertigo; OA      Functional Outcome Measure Used DHI   Functional Outcome Score 44/96 or 46% disability (23)       Insurance: Primary: Payor: Kim Thomas /  /  / , $40 co-pay per visit  Secondary:    Authorization Information: Pre-certification    Visit # 7, 7/10 for progress note   Visits Allowed: Based on medical necessity   Recertification Date: April 3, 23   Physician Follow-Up:    Physician Orders:    History of Present Illness: Papito Peacock was diagnosed with down beating nystagmus years ago. She has been seen by various specialists and was referred to PT at one time to address her balance. This last , Sherice slipped off the toilet and she was told that she broke her left femur. She underwent surgery on  at [facility] and was discharged to [facility] in [facility] for rehab. She was finally discharged home around December. She is currently living in a \"mother in law\" house at her daughters home in [place]. She is able to walk with a walker, however she feel really \"dizzy\". Prior to recent fall, she was always dizzy. SUBJECTIVE: Pt sitting in WC upon arrival. Pt stated she feels ok today. Pt feels she may have walked to far last session because her L hip and knee was sore.            TREATMENT   Precautions:    Pain: 4/10 L knee and hip, R shoulder    X in shaded column indicates activity completed today   Modalities Parameters/  Location  Notes                     Manual Therapy Time/Technique  Notes                     Exercise/Intervention   Notes   Vestibular Education:   Provided handout on unilateral vestibular hypofunction; reviewed clinical findings with both Cori and her daughter; discussed prior vision therapy   Post Maneuver Precautions:      Review of HEP; vision therapy handouts        Sit-stand 10x      Gait training with FWW 2x50 ft      // bars: forward walking, side step 2x ea  x Cues for heel toe pattern   Anterior/posterior wt shifts; marches on Airex 10x  x    Doreen: forward step over and back 10x ea. Cuing to lead with heel first    Seated LAQ and hip flexion with abd 15x ea. x    Seated DF 15x ea. x    Seated hip abd and add with ball 15x orange x    NuStep 5 min Level 5 x    Alternating step tap (4 inch step) 10x      Airex: standing balance without UE support  2x15 sec      Standing with head turns (horizontal and vertical) 10x  x                  Walking with RW with head turns every 3 steps 1x50 ft  x Cuing needed to continue walking   Walking with  ft with 1 rest break        Specific Interventions Next Treatment: Initiate vestibular adaptation program with x1 viewing exercises; dynamic balance and gait activities     Activity/Treatment Tolerance:  [x]  Patient tolerated treatment well  []  Patient limited by fatigue  []  Patient limited by pain   []  Patient limited by medical complications  []  Other:     Assessment: Francine Gary had more pain today in L hip and knee preventing smooth walking with equal step length. Pt given more sitting RB's today due to pain. Pt was able to increase sitting ex to 15 reps and had no increase in pain levels by end of session. Continued work on balance reactions with cues for head movements during gait activity.     Body Structures/Functions/Activity Limitations:impaired activity tolerance, impaired balance, impaired endurance, impaired ROM, impaired safety awareness, impaired strength, and abnormal gait  Prognosis: fair    GOALS:  Patient Goal: resolve dizziness; improve balance     Short Term Goals:  Time Frame: 4 weeks  Cori will demonstrate and consistently use an ankle strategy to maintain standing balance with perturbations to her trunk to keep from falling. Cori's DHI will improve to <26 indicating clinically significant improvement in disability related to dizziness as her VOR hypofunction resolves. Bossman Singleton will perform 10 sit-stands with minimal verbal or manual cuing and minimal UE support as her strength and stabilization improves. Bossman Singleton will walk 500ft+ with FWW consistently allowing greater independence with community ambulation. Long Term Goals:  Time Frame: 8+ weeks  Bossman Singleton will be discharged from PT with independent HEP to maintain all gains achieved in therapy. Bossman Singleton will report 50% improvement in balance, wave like sensation and dizziness thereby providing greater confidence in her gait and decrease in feeling off balance. Vanesas DHI will improve to <15 indicating minimal disability related to her dizziness. Patient Education:   []  HEP/Education Completed: Plan of Care, Goals, Unilateral VOR hypofunction  [facility] Access Code:  []  No new Education completed  [x]  Reviewed Prior HEP      [x]  Patient verbalized and/or demonstrated understanding of education provided. []  Patient unable to verbalize and/or demonstrate understanding of education provided. Will continue education.   [x]  Barriers to learning: n/a    PLAN:  Treatment Recommendations: Strengthening, Range of Motion, Balance Training, Functional Mobility Training, Transfer Training, Endurance Training, Gait Training, Stair Training, Neuromuscular Re-education, Manual Therapy - Soft Tissue Mobilization, Manual Therapy - Joint Manipulation, Pain Management, Home Exercise Program, Patient Education, Vestibular Rehabilitation, and Modalities    []  Plan of care initiated. Plan to see patient 2 times per week for 8 weeks to address the treatment planned outlined above.   [x]  Continue with current plan of care  []  Modify plan of care as follows:    []  Hold pending physician visit  []  Discharge    Time In 1515   Time Out 1600   Timed Code Minutes: 45 min   Total Treatment Time: 45 min       Electronically Signed by: Barbara Cassidy PTA

## 2023-03-03 ENCOUNTER — HOSPITAL ENCOUNTER (OUTPATIENT)
Dept: PHYSICAL THERAPY | Age: 88
Setting detail: THERAPIES SERIES
End: 2023-03-03
Payer: MEDICARE

## 2023-03-07 ENCOUNTER — HOSPITAL ENCOUNTER (OUTPATIENT)
Dept: PHYSICAL THERAPY | Age: 88
Setting detail: THERAPIES SERIES
End: 2023-03-07
Payer: MEDICARE

## 2023-03-09 ENCOUNTER — HOSPITAL ENCOUNTER (OUTPATIENT)
Dept: PHYSICAL THERAPY | Age: 88
Setting detail: THERAPIES SERIES
Discharge: HOME | End: 2023-03-09
Payer: MEDICARE

## 2023-03-09 PROCEDURE — 97110 THERAPEUTIC EXERCISES: CPT

## 2023-03-09 NOTE — PROGRESS NOTES
Yumiko Croft [address]  PHYSICAL THERAPY  [] VESTIBULAR EVALUATION  [x] DAILY NOTE [] PROGRESS NOTE [] DISCHARGE NOTE    [] [facility] [place]   [] [address]    [] [address] [place]   [x] Genoveva Young    Date: 3/9/2023  Patient Name:  Dagoberto Dewey  : 1935  MRN: [id number]  CSN: [id number]    Referring Practitioner Vishnu Martinez*   Diagnosis At high risk for falls [Z91.81]  Vertigo [R42]    Treatment Diagnosis Difficulty walking; imbalance    Date of Evaluation 23   Additional Pertinent History She has a history of 2 new hips and knees; HTN; vertigo; OA      Functional Outcome Measure Used DHI   Functional Outcome Score 44/96 or 46% disability (23)       Insurance: Primary: Payor: Juan Manuel Miranda /  /  / , $40 co-pay per visit  Secondary:    Authorization Information: Pre-certification    Visit # 8, 8/10 for progress note   Visits Allowed: Based on medical necessity   Recertification Date: April 3, 23   Physician Follow-Up:    Physician Orders:    History of Present Illness: Willow Kuo was diagnosed with down beating nystagmus years ago. She has been seen by various specialists and was referred to PT at one time to address her balance. This last , Sherice slipped off the toilet and she was told that she broke her left femur. She underwent surgery on  at [facility] and was discharged to [facility] in [facility] for rehab. She was finally discharged home around December. She is currently living in a \"mother in law\" house at her daughters home in [facility]. She is able to walk with a walker, however she feel really \"dizzy\". Prior to recent fall, she was always dizzy. SUBJECTIVE:  Subjective reports of considerable fatigue upon initial patient interview. Patient reports feeling unwell recently.      TREATMENT   Precautions:    Pain: 4/10 L knee and hip, R shoulder    X in shaded column indicates activity completed today   Modalities Parameters/  Location  Notes                     Manual Therapy Time/Technique  Notes                     Exercise/Intervention   Notes   Vestibular Education:   Provided handout on unilateral vestibular hypofunction; reviewed clinical findings with both Cori and her daughter; discussed prior vision therapy   Post Maneuver Precautions:      Review of HEP; vision therapy handouts        Sit-stand 10x      Gait training with FWW 2x50 ft      // bars: forward walking, side step 2x ea  X Cues for heel toe pattern   Anterior/posterior wt shifts; marches on Airex 10x      Doreen: forward step over and back 10x ea. Cuing to lead with heel first    Seated LAQ and hip flexion with abd 15x ea. Seated DF 15x ea. Seated hip abd and add with ball 15x orange     NuStep 5 min Level 5 X    Alternating step tap (4 inch step) 10x      Airex: standing balance without UE support  2x15 sec      Standing with head turns (horizontal and vertical) 10x                    Walking with RW with head turns every 3 steps 1x50 ft  X Cuing needed to continue walking   Walking with  ft with 1 rest break        Specific Interventions Next Treatment: Initiate vestibular adaptation program with x1 viewing exercises; dynamic balance and gait activities     Activity/Treatment Tolerance:  [x]  Patient tolerated treatment well  []  Patient limited by fatigue  []  Patient limited by pain   []  Patient limited by medical complications  []  Other:     Assessment: Treatment interventions completed as recorded above. Frequent therapeutic rest breaks required due to fatigue. Treatment session time reduced this date based on subjective reports. GOALS:  Patient Goal: resolve dizziness; improve balance     Short Term Goals:  Time Frame: 4 weeks  Cori will demonstrate and consistently use an ankle strategy to maintain standing balance with perturbations to her trunk to keep from falling.   Cori's DHI will improve to <26 indicating clinically significant improvement in disability related to dizziness as her VOR hypofunction resolves. Benja Corona will perform 10 sit-stands with minimal verbal or manual cuing and minimal UE support as her strength and stabilization improves. Benja Corona will walk 500ft+ with FWW consistently allowing greater independence with community ambulation. Long Term Goals:  Time Frame: 8+ weeks  Benja Corona will be discharged from PT with independent HEP to maintain all gains achieved in therapy. Benja Corona will report 50% improvement in balance, wave like sensation and dizziness thereby providing greater confidence in her gait and decrease in feeling off balance. Cori's DHI will improve to <15 indicating minimal disability related to her dizziness. Patient Education:   []  HEP/Education Completed: Plan of Care, Goals, Unilateral VOR hypofunction  [facility] Access Code:  []  No new Education completed  [x]  Reviewed Prior HEP      [x]  Patient verbalized and/or demonstrated understanding of education provided. []  Patient unable to verbalize and/or demonstrate understanding of education provided. Will continue education. [x]  Barriers to learning: n/a    PLAN:  Treatment Recommendations: Strengthening, Range of Motion, Balance Training, Functional Mobility Training, Transfer Training, Endurance Training, Gait Training, Stair Training, Neuromuscular Re-education, Manual Therapy - Soft Tissue Mobilization, Manual Therapy - Joint Manipulation, Pain Management, Home Exercise Program, Patient Education, Vestibular Rehabilitation, and Modalities    []  Plan of care initiated. Plan to see patient 2 times per week for 8 weeks to address the treatment planned outlined above.   [x]  Continue with current plan of care  []  Modify plan of care as follows:    []  Hold pending physician visit  []  Discharge    Time In 1515   Time Out 1545   Timed Code Minutes: 30 min   Total Treatment Time: 30 min       Electronically Signed by: Pam Marcum [id number]

## 2023-03-14 ENCOUNTER — HOSPITAL ENCOUNTER (OUTPATIENT)
Dept: PHYSICAL THERAPY | Age: 88
Setting detail: THERAPIES SERIES
Discharge: HOME | End: 2023-03-14
Payer: MEDICARE

## 2023-03-14 PROCEDURE — 97110 THERAPEUTIC EXERCISES: CPT

## 2023-03-14 PROCEDURE — 97530 THERAPEUTIC ACTIVITIES: CPT

## 2023-03-14 PROCEDURE — 97112 NEUROMUSCULAR REEDUCATION: CPT

## 2023-03-14 NOTE — PROGRESS NOTES
Yum[address] Croft [address]  PHYSICAL THERAPY  [] VESTIBULAR EVALUATION  [] DAILY NOTE [x] PROGRESS NOTE [] DISCHARGE NOTE    [] [facility] [place]   [] [address]    [] [address] [facility]   [x] Tristan Meraz    Date: 3/14/2023  Patient Name:  Jenise Perkins  : 1935  MRN: [id number]  CSN: [id number]    Referring Practitioner Klaus Wadsworth*   Diagnosis At high risk for falls [Z91.81]  Vertigo [R42]    Treatment Diagnosis Difficulty walking; imbalance    Date of Evaluation 23   Additional Pertinent History She has a history of 2 new hips and knees; HTN; vertigo; OA      Functional Outcome Measure Used DHI   Functional Outcome Score 44/96 or 46% disability (23)  56/100 or 56% disability (3/14/23)       Insurance: Primary: Payor: Jag Simon /  /  / , $40 co-pay per visit  Secondary:    Authorization Information: Pre-certification    Visit # 9, 0/10 for progress note   Visits Allowed: Based on medical necessity   Recertification Date: April 3, 23   Physician Follow-Up:    Physician Orders:    History of Present Illness: Anni Bello was diagnosed with down beating nystagmus years ago. She has been seen by various specialists and was referred to PT at one time to address her balance. This last , Sherice slipped off the toilet and she was told that she broke her left femur. She underwent surgery on  at [facility] and was discharged to [facility] in [facility] for rehab. She was finally discharged home around December. She is currently living in a \"mother in law\" house at her daughters home in [facility]. She is able to walk with a walker, however she feel really \"dizzy\". Prior to recent fall, she was always dizzy. SUBJECTIVE:  Feels like she has regressed since her recent illness.  Her dizziness is worse today and she really struggles with looking up and to the left     TREATMENT   Precautions:    Pain: 4/10 L knee and hip, R shoulder    X in shaded column indicates activity completed today   Modalities Parameters/  Location  Notes                     Manual Therapy Time/Technique  Notes                     Exercise/Intervention   Notes   Vestibular Education:   Provided handout on unilateral vestibular hypofunction; reviewed clinical findings with both Cori and her daughter; discussed prior vision therapy   Post Maneuver Precautions:      Review of HEP; vision therapy handouts        Sit-stand 10x      Gait training with FWW 2x50 ft      // bars: forward walking, side step 2x ea  X Cues for heel toe pattern   Anterior/posterior wt shifts; marches  10x  x    Doreen: forward step over and back 10x ea. Cuing to lead with heel first    Seated LAQ and hip flexion with abd 15x ea. x    Seated DF 15x ea. Seated hip abd and add with ball 15x orange x    NuStep 5 min Level 5     Alternating step tap (4 inch step) 10x      Airex: standing balance without UE support  2x15 sec      Eye/head movement 10x  x Horizontal plane                                      Standing with head turns (horizontal and vertical) 10x      Oculomotor testing    x (+) Head thrust test bilaterally  Saccades noted to the left     Review of goals; LEFS   x    Walking with RW with head turns every 3 steps 1x50 ft   Cuing needed to continue walking   Walking with  ft with 1 rest break        Specific Interventions Next Treatment: Initiate vestibular adaptation program with x1 viewing exercises; dynamic balance and gait activities     Activity/Treatment Tolerance:  [x]  Patient tolerated treatment well  []  Patient limited by fatigue  []  Patient limited by pain   []  Patient limited by medical complications  []  Other:     Assessment: Despite her increase in perceived disability related to her dizziness, Allan Schrader has demonstrated improvements in strength, balance and gait.  She did have a set back this past week secondary to stomach bug, however she continues to push through and do her best. Today we initiated vestibular adaptation program with emphasis on  out eye/head movements. She will benefit from continued therapy to progress her dynamic balance and gait program to further minimize her fall risk. GOALS:  Patient Goal: resolve dizziness; improve balance     Short Term Goals:  Time Frame: 4 weeks  Cori will demonstrate and consistently use an ankle strategy to maintain standing balance with perturbations to her trunk to keep from falling. GOAL MET   Vanesas DHI will improve to <26 indicating clinically significant improvement in disability related to dizziness as her VOR hypofunction resolves. NOT MET   Vivian Spann will perform 10 sit-stands with minimal verbal or manual cuing and minimal UE support as her strength and stabilization improves. NOT MET   Vivian Spann will walk 500ft+ with FWW consistently allowing greater independence with community ambulation. NOT MET       Long Term Goals:  Time Frame: 8+ weeks  Cori will be discharged from PT with independent HEP to maintain all gains achieved in therapy. Vivian Spann will report 50% improvement in balance, wave like sensation and dizziness thereby providing greater confidence in her gait and decrease in feeling off balance. Vanesas DHI will improve to <15 indicating minimal disability related to her dizziness. Patient Education:   []  HEP/Education Completed: Plan of Care, Goals, Unilateral VOR hypofunction  [employer] Access Code:  []  No new Education completed  [x]  Reviewed Prior HEP      [x]  Patient verbalized and/or demonstrated understanding of education provided. []  Patient unable to verbalize and/or demonstrate understanding of education provided. Will continue education.   [x]  Barriers to learning: n/a    PLAN:  Treatment Recommendations: Strengthening, Range of Motion, Balance Training, Functional Mobility Training, Transfer Training, Endurance Training, Gait Training, Stair Training, Neuromuscular Re-education, Manual Therapy - Soft Tissue Mobilization, Manual Therapy - Joint Manipulation, Pain Management, Home Exercise Program, Patient Education, Vestibular Rehabilitation, and Modalities    []  Plan of care initiated. Plan to see patient 2 times per week for 8 weeks to address the treatment planned outlined above.   [x]  Continue with current plan of care  []  Modify plan of care as follows:    []  Hold pending physician visit  []  Discharge    Time In 1515   Time Out 1555   Timed Code Minutes: 40 min   Total Treatment Time: 40 min       Electronically Signed by: Ese Platt, JULIETH Burns [address]66" Lulu Turner, DPT  [id number]

## 2023-03-17 ENCOUNTER — HOSPITAL ENCOUNTER (OUTPATIENT)
Dept: PHYSICAL THERAPY | Age: 88
Setting detail: THERAPIES SERIES
Discharge: HOME | End: 2023-03-17
Payer: MEDICARE

## 2023-03-17 PROCEDURE — 97530 THERAPEUTIC ACTIVITIES: CPT

## 2023-03-17 PROCEDURE — 97112 NEUROMUSCULAR REEDUCATION: CPT

## 2023-03-17 NOTE — PROGRESS NOTES
Yumiko [address]ft [address]  PHYSICAL THERAPY  [] VESTIBULAR EVALUATION  [x] DAILY NOTE [] PROGRESS NOTE [] DISCHARGE NOTE    [] [facility] [place]   [] [address]    [] [address] [place]   [x] Keyla Hooper    Date: 3/17/2023  Patient Name:  Marco A Garibay  : 1935  MRN: [id number]  CSN: [id number]    Referring Practitioner Dimitri Manzo*   Diagnosis At high risk for falls [Z91.81]  Vertigo [R42]    Treatment Diagnosis Difficulty walking; imbalance    Date of Evaluation 23   Additional Pertinent History She has a history of 2 new hips and knees; HTN; vertigo; OA      Functional Outcome Measure Used DHI   Functional Outcome Score 44/96 or 46% disability (23)  56/100 or 56% disability (3/14/23)       Insurance: Primary: Payor: Helena Cordova /  /  / , $40 co-pay per visit  Secondary:    Authorization Information: Pre-certification    Visit # 10, 1/10 for progress note   Visits Allowed: Based on medical necessity   Recertification Date: April 3, 23   Physician Follow-Up:    Physician Orders:    History of Present Illness: Katarina Caba was diagnosed with down beating nystagmus years ago. She has been seen by various specialists and was referred to PT at one time to address her balance. This last , Sherice slipped off the toilet and she was told that she broke her left femur. She underwent surgery on  at [facility] and was discharged to [facility] in [facility] for rehab. She was finally discharged home around December. She is currently living in a \"mother in law\" house at her daughters home in [facility]. She is able to walk with a walker, however she feel really \"dizzy\". Prior to recent fall, she was always dizzy. SUBJECTIVE:  She's been working on her HEP and she is feeling better overall.     TREATMENT   Precautions:    Pain: 4/10 L knee and hip, R shoulder    X in shaded column indicates activity completed today   Modalities Parameters/  Location  Notes Manual Therapy Time/Technique  Notes                     Exercise/Intervention   Notes   Vestibular Education:   Provided handout on unilateral vestibular hypofunction; reviewed clinical findings with both Cori and her daughter; discussed prior vision therapy   Post Maneuver Precautions:      Review of HEP; vision therapy handouts        Sit-stand 10x      Gait training with FWW 2x50 ft      // bars: forward walking, side step 2x ea  X Cues for heel toe pattern   Anterior/posterior wt shifts; marches  10x  x    Doreen: forward step over and back 10x ea. Cuing to lead with heel first    Seated LAQ and hip flexion with abd 15x ea. x    Seated DF 15x ea. Seated hip abd and add with ball 15x orange x    NuStep 5 min Level 5     Alternating step tap (4 inch step) 10x      Airex: standing balance without UE support  2x15 sec  x    Eye/head movement 10x  x Horizontal plane; seated and standing    3-way hip 10x bilaterally      X1 viewing sitting  2x15 sec  x                         Standing with head turns (horizontal and vertical) 10x      Oculomotor testing    x (+) Head thrust test bilaterally  Saccades noted to the left     Review of goals; LEFS       Walking with RW with head turns every 3 steps 1x50 ft  x Cuing needed to continue walking   Walking with  ft with 1 rest break        Specific Interventions Next Treatment: Initiate vestibular adaptation program with x1 viewing exercises; dynamic balance and gait activities     Activity/Treatment Tolerance:  [x]  Patient tolerated treatment well  []  Patient limited by fatigue  []  Patient limited by pain   []  Patient limited by medical complications  []  Other:     Assessment: Queenie Wood is adhering to her HEP as noted by her improved consistency with eye/head movements. Added trial of x1 viewing exercises, seated, to begin with left VOR adaptation.   GOALS:  Patient Goal: resolve dizziness; improve balance     Short Term Goals:  Time Frame: 4 weeks  Lynette Spann will demonstrate and consistently use an ankle strategy to maintain standing balance with perturbations to her trunk to keep from falling. GOAL MET   Vanesas DHI will improve to <26 indicating clinically significant improvement in disability related to dizziness as her VOR hypofunction resolves. NOT MET   Lynette Spann will perform 10 sit-stands with minimal verbal or manual cuing and minimal UE support as her strength and stabilization improves. NOT MET   Lynette Spann will walk 500ft+ with FWW consistently allowing greater independence with community ambulation. NOT MET       Long Term Goals:  Time Frame: 8+ weeks  Cori will be discharged from PT with independent HEP to maintain all gains achieved in therapy. Lynette Spann will report 50% improvement in balance, wave like sensation and dizziness thereby providing greater confidence in her gait and decrease in feeling off balance. Vanesas DHI will improve to <15 indicating minimal disability related to her dizziness. Patient Education:   []  HEP/Education Completed: Plan of Care, Goals, Unilateral VOR hypofunction  [facility] Access Code:  []  No new Education completed  [x]  Reviewed Prior HEP      [x]  Patient verbalized and/or demonstrated understanding of education provided. []  Patient unable to verbalize and/or demonstrate understanding of education provided. Will continue education. [x]  Barriers to learning: n/a    PLAN:  Treatment Recommendations: Strengthening, Range of Motion, Balance Training, Functional Mobility Training, Transfer Training, Endurance Training, Gait Training, Stair Training, Neuromuscular Re-education, Manual Therapy - Soft Tissue Mobilization, Manual Therapy - Joint Manipulation, Pain Management, Home Exercise Program, Patient Education, Vestibular Rehabilitation, and Modalities    []  Plan of care initiated. Plan to see patient 2 times per week for 8 weeks to address the treatment planned outlined above.   [x]  Continue with current plan of care  []  Modify plan of care as follows:    []  Hold pending physician visit  []  Discharge    Time In 1515   Time Out 1600   Timed Code Minutes: 45 min   Total Treatment Time: 45 min       Electronically Signed by: JULIETH Morataya 7066" Taisha Mccarthy DPT  [id number]

## 2023-03-21 ENCOUNTER — HOSPITAL ENCOUNTER (OUTPATIENT)
Dept: PHYSICAL THERAPY | Age: 88
Setting detail: THERAPIES SERIES
Discharge: HOME | End: 2023-03-21
Payer: MEDICARE

## 2023-03-21 PROCEDURE — 97110 THERAPEUTIC EXERCISES: CPT

## 2023-03-21 PROCEDURE — 97112 NEUROMUSCULAR REEDUCATION: CPT

## 2023-03-21 PROCEDURE — 97530 THERAPEUTIC ACTIVITIES: CPT

## 2023-03-21 NOTE — PROGRESS NOTES
Yumiko [address]  PHYSICAL THERAPY  [] VESTIBULAR EVALUATION  [x] DAILY NOTE [] PROGRESS NOTE [] DISCHARGE NOTE    [] [facility] [place]   [] [address]    [] [address] [facility]   [x] Jazmine Stephens    Date: 3/21/2023  Patient Name:  Gerri Garcia  : 1935  MRN: [id number]  CSN: [id number]    Referring Practitioner Sebastian Storm*   Diagnosis At high risk for falls [Z91.81]  Vertigo [R42]    Treatment Diagnosis Difficulty walking; imbalance    Date of Evaluation 23   Additional Pertinent History She has a history of 2 new hips and knees; HTN; vertigo; OA      Functional Outcome Measure Used [facility]   Functional Outcome Score 44/96 or 46% disability (23)  56/100 or 56% disability (3/14/23)       Insurance: Primary: Payor: Da Jovel /  /  / , $40 co-pay per visit  Secondary:    Authorization Information: Pre-certification    Visit # 11, 2/10 for progress note   Visits Allowed: Based on medical necessity   Recertification Date: April 3, 23   Physician Follow-Up:    Physician Orders:    History of Present Illness: Joel Rene was diagnosed with down beating nystagmus years ago. She has been seen by various specialists and was referred to PT at one time to address her balance. This last , Sherice slipped off the toilet and she was told that she broke her left femur. She underwent surgery on  at [facility] and was discharged to [facility] in [facility] for rehab. She was finally discharged home around December. She is currently living in a \"mother in law\" house at her daughters home in [facility]. She is able to walk with a walker, however she feel really \"dizzy\". Prior to recent fall, she was always dizzy. SUBJECTIVE:  Feeling adequate today; her caregiver returned!  Feeling really dizzy right now    TREATMENT   Precautions:    Pain: 4/10 L knee and hip, R shoulder    X in shaded column indicates activity completed today   Modalities

## 2023-03-23 ENCOUNTER — HOSPITAL ENCOUNTER (OUTPATIENT)
Dept: PHYSICAL THERAPY | Age: 88
Setting detail: THERAPIES SERIES
Discharge: HOME | End: 2023-03-23
Payer: MEDICARE

## 2023-03-23 PROCEDURE — 97110 THERAPEUTIC EXERCISES: CPT

## 2023-03-23 PROCEDURE — 97530 THERAPEUTIC ACTIVITIES: CPT

## 2023-03-23 PROCEDURE — 97112 NEUROMUSCULAR REEDUCATION: CPT

## 2023-03-23 NOTE — PROGRESS NOTES
Yumiko [address]ft [address]  PHYSICAL THERAPY  [] VESTIBULAR EVALUATION  [x] DAILY NOTE [] PROGRESS NOTE [] DISCHARGE NOTE    [] [facility] [place]   [] [address]    [] [address] [place]   [x] Adolph Ho    Date: 3/23/2023  Patient Name:  Shanice Torres  : 1935  MRN: [id number]  CSN: [id number]    Referring Practitioner Atul Milian*   Diagnosis At high risk for falls [Z91.81]  Vertigo [R42]    Treatment Diagnosis Difficulty walking; imbalance    Date of Evaluation 23   Additional Pertinent History She has a history of 2 new hips and knees; HTN; vertigo; OA      Functional Outcome Measure Used [facility]   Functional Outcome Score 44/96 or 46% disability (23)  56/100 or 56% disability (3/14/23)       Insurance: Primary: Payor: Mckenzie Plane /  /  / , $40 co-pay per visit  Secondary:    Authorization Information: Pre-certification    Visit # 12, 3/10 for progress note   Visits Allowed: Based on medical necessity   Recertification Date: April 3, 23   Physician Follow-Up:    Physician Orders:    History of Present Illness: Mallorie Fried was diagnosed with down beating nystagmus years ago. She has been seen by various specialists and was referred to PT at one time to address her balance. This last , Sherice slipped off the toilet and she was told that she broke her left femur. She underwent surgery on  at [facility] and was discharged to [facility] in [facility] for rehab. She was finally discharged home around December. She is currently living in a \"mother in law\" house at her daughters home in [facility]. She is able to walk with a walker, however she feel really \"dizzy\". Prior to recent fall, she was always dizzy.       SUBJECTIVE: Doing well today; feeling very dizzy today     TREATMENT   Precautions:    Pain: 4/10 L knee and hip, R shoulder    X in shaded column indicates activity completed today   Modalities Parameters/  Location  Notes                     Manual

## 2023-03-28 ENCOUNTER — HOSPITAL ENCOUNTER (OUTPATIENT)
Dept: PHYSICAL THERAPY | Age: 88
Setting detail: THERAPIES SERIES
Discharge: HOME | End: 2023-03-28
Payer: MEDICARE

## 2023-03-28 PROCEDURE — 97112 NEUROMUSCULAR REEDUCATION: CPT

## 2023-03-28 PROCEDURE — 97110 THERAPEUTIC EXERCISES: CPT

## 2023-03-28 PROCEDURE — 97530 THERAPEUTIC ACTIVITIES: CPT

## 2023-03-28 NOTE — PROGRESS NOTES
plan of care  []  Modify plan of care as follows:    []  Hold pending physician visit  []  Discharge    Time In 1515   Time Out 1555   Timed Code Minutes: 40 min   Total Treatment Time: 40 min       Electronically Signed by: Lisbeth Kennedy, JULIETH Burns 7066" Koby aSnchez, DPT  [id number]

## 2023-03-30 ENCOUNTER — HOSPITAL ENCOUNTER (OUTPATIENT)
Dept: PHYSICAL THERAPY | Age: 88
Setting detail: THERAPIES SERIES
Discharge: HOME | End: 2023-03-30
Payer: MEDICARE

## 2023-03-30 PROCEDURE — 97116 GAIT TRAINING THERAPY: CPT

## 2023-03-30 PROCEDURE — 97110 THERAPEUTIC EXERCISES: CPT

## 2023-03-30 NOTE — PROGRESS NOTES
above.  [x]  Continue with current plan of care  []  Modify plan of care as follows:    []  Hold pending physician visit  []  Discharge    Time In 1545   Time Out 1625   Timed Code Minutes: 40 min   Total Treatment Time: 40 min       Electronically Signed by: Monisha Gustafson, [id number]

## 2023-04-03 ENCOUNTER — HOSPITAL ENCOUNTER (OUTPATIENT)
Dept: PHYSICAL THERAPY | Age: 88
Setting detail: THERAPIES SERIES
Discharge: HOME | End: 2023-04-03
Payer: MEDICARE

## 2023-04-03 PROCEDURE — 97110 THERAPEUTIC EXERCISES: CPT

## 2023-04-03 NOTE — PROGRESS NOTES
per week for 8 weeks to address the treatment planned outlined above.   [x]  Continue with current plan of care  []  Modify plan of care as follows:    []  Hold pending physician visit  []  Discharge    Time In 1505   Time Out 1650   Timed Code Minutes: 45 min   Total Treatment Time: 45 min       Electronically Signed by: Katie Awan [id number]

## 2023-04-06 ENCOUNTER — APPOINTMENT (OUTPATIENT)
Dept: PHYSICAL THERAPY | Age: 88
End: 2023-04-06
Payer: MEDICARE

## 2023-04-20 DIAGNOSIS — N18.31 STAGE 3A CHRONIC KIDNEY DISEASE (HCC): Primary | ICD-10-CM

## 2023-04-20 LAB
BASOPHILS ABSOLUTE: NORMAL
BASOPHILS RELATIVE PERCENT: NORMAL
BUN BLDV-MCNC: 25 MG/DL
CALCIUM SERPL-MCNC: 10 MG/DL
CHLORIDE BLD-SCNC: 105 MMOL/L
CO2: 26 MMOL/L
CREAT SERPL-MCNC: 1.2 MG/DL
CREATININE, URINE: 15.6
EGFR: 41
EOSINOPHILS ABSOLUTE: NORMAL
EOSINOPHILS RELATIVE PERCENT: NORMAL
GLUCOSE BLD-MCNC: 89 MG/DL
HCT VFR BLD CALC: 43.3 % (ref 36–46)
HEMOGLOBIN: 13.5 G/DL (ref 12–16)
LYMPHOCYTES ABSOLUTE: NORMAL
LYMPHOCYTES RELATIVE PERCENT: NORMAL
MCH RBC QN AUTO: NORMAL PG
MCHC RBC AUTO-ENTMCNC: NORMAL G/DL
MCV RBC AUTO: NORMAL FL
MICROALBUMIN/CREAT 24H UR: 1.2 MG/G{CREAT}
MICROALBUMIN/CREAT UR-RTO: 77
MONOCYTES ABSOLUTE: NORMAL
MONOCYTES RELATIVE PERCENT: NORMAL
NEUTROPHILS ABSOLUTE: NORMAL
NEUTROPHILS RELATIVE PERCENT: NORMAL
PHOSPHORUS: 3.8 MG/DL
PLATELET # BLD: 161 K/ΜL
PMV BLD AUTO: NORMAL FL
POTASSIUM SERPL-SCNC: 4.5 MMOL/L
PTH INTACT: 104
RBC # BLD: 4.59 10^6/ΜL
SODIUM BLD-SCNC: 143 MMOL/L
VITAMIN D 25-HYDROXY: 28
VITAMIN D2, 25 HYDROXY: NORMAL
VITAMIN D3,25 HYDROXY: NORMAL
WBC # BLD: 7.6 10^3/ML

## 2023-04-28 ENCOUNTER — OFFICE VISIT (OUTPATIENT)
Dept: NEPHROLOGY | Age: 88
End: 2023-04-28
Payer: MEDICARE

## 2023-04-28 VITALS
SYSTOLIC BLOOD PRESSURE: 102 MMHG | DIASTOLIC BLOOD PRESSURE: 68 MMHG | HEIGHT: 62 IN | BODY MASS INDEX: 42.6 KG/M2 | WEIGHT: 231.48 LBS | OXYGEN SATURATION: 97 % | HEART RATE: 89 BPM

## 2023-04-28 DIAGNOSIS — N18.31 STAGE 3A CHRONIC KIDNEY DISEASE (HCC): Primary | ICD-10-CM

## 2023-04-28 PROCEDURE — 99213 OFFICE O/P EST LOW 20 MIN: CPT | Performed by: INTERNAL MEDICINE

## 2023-04-28 PROCEDURE — 1123F ACP DISCUSS/DSCN MKR DOCD: CPT | Performed by: INTERNAL MEDICINE

## 2023-04-28 NOTE — PROGRESS NOTES
[facility] [place] [facility]  [facility]  [address] [facility] [address] [place] [address]  Dept: [phone]  Loc: [phone]  Progress Note  4/28/2023 10:13 AM      Pt Name:    Rahel Fontenot  YOB: 1935  Primary Care Physician:  Ariela Araya, APRN - CNP     Chief Complaint:   Chief Complaint   Patient presents with    Chronic Kidney Disease     iiia        History of Present Illness: This is a follow-up visit for CKD III. History of HTN, Afib, hypothyroidism, nystagmus, OA. Since last visit had spiral fracture of left femur in June after a fall. She had surgery was in hospital in [place]. She is back home now. She gets frequent UTIs but last UA was normal.         Pertinent items are noted in HPI. Past History:  Past Medical History:   Diagnosis Date    Atrial fibrillation (HCC)     CKD (chronic kidney disease) stage 3, GFR 30-59 ml/min (HCC)     Hypertension     Nystagmus     Thyroid disease      Past Surgical History:   Procedure Laterality Date    JOINT REPLACEMENT      bilat hips and knees    MOHS SURGERY Right 9/3/2021    MOHS REPAIR BCC RIGHT NASOLABIAL FOLD performed by Shivam Shannon MD at [address]:  /68 (Site: Left Upper Arm, Position: Sitting, Cuff Size: Large Adult)   Pulse 89   Ht 5' 2\" (1.575 m)   Wt 231 lb 7.7 oz (105 kg)   SpO2 97%   BMI 42.34 kg/m²   Wt Readings from Last 3 Encounters:   04/28/23 231 lb 7.7 oz (105 kg)   01/20/23 215 lb (97.5 kg)   03/18/22 227 lb (103 kg)     Body mass index is 42.34 kg/m².      General Appearance: alert and cooperative with exam, appears comfortable, no distress, pleasant  HEENT: EOMI, moist oral mucus membranes  Neck: No jugular venous distention  Lungs: Air entry B/L, no crackles or rales, no use of accessory muscles  Heart: irregular  GI: soft, non-tender, no guarding  Extremities: trace LE edema, +compression stockings  Skin:

## 2023-05-11 ENCOUNTER — HOSPITAL ENCOUNTER (OUTPATIENT)
Dept: PHYSICAL THERAPY | Age: 88
Setting detail: THERAPIES SERIES
Discharge: HOME | End: 2023-05-11
Payer: MEDICARE

## 2023-05-11 PROCEDURE — 97530 THERAPEUTIC ACTIVITIES: CPT

## 2023-05-11 PROCEDURE — 97110 THERAPEUTIC EXERCISES: CPT

## 2023-05-11 NOTE — PROGRESS NOTES
shaded column indicates activity completed today   Modalities Parameters/  Location  Notes                     Manual Therapy Time/Technique  Notes                     Exercise/Intervention   Notes   Vestibular Education:   Provided handout on unilateral vestibular hypofunction; reviewed clinical findings with both Cori and her daughter; discussed prior vision therapy   Post Maneuver Precautions:      Review of HEP; vision therapy handouts        Sit-stand 10x  x    Gait training with FWW 2x50 ft  x    // bars: forward walking, side step 2x ea    X Cues for heel toe pattern   Anterior/posterior wt shifts; marches  10x        Doreen: forward step over and back 10x ea. Cuing to lead with heel first    Seated LAQ and hip flexion with abd 15x ea. Seated DF 15x ea. x    Seated hip abd and add with ball 15x orange x    NuStep 5 min Level 5       Alternating step tap (4 inch step) 10x      Airex: standing balance without UE support  2x15 sec        Eye/head movement 10x   Horizontal plane; seated and standing    3-way hip 10x bilaterally      X1 viewing sitting  2x15 sec             Gait training in parallel bars. X           Standing with head turns (horizontal and vertical) 10x        Oculomotor testing     (+) Head thrust test bilaterally  Saccades noted to the left     Review of goals; LEFS   x    Walking with RW with head turns every 3 steps 1x50 ft   Cuing needed to continue walking   Walking with  ft with 1 rest break        Specific Interventions Next Treatment: Initiate vestibular adaptation program with x1 viewing exercises; dynamic balance and gait activities     Activity/Treatment Tolerance:  [x]  Patient tolerated treatment well  []  Patient limited by fatigue  []  Patient limited by pain   []  Patient limited by medical complications  []  Other:     Assessment: Jessica Hummel was notably weaker and quick to fatigue today.  Over the last month she did work on walking more, however she has had a

## 2023-05-17 ENCOUNTER — HOSPITAL ENCOUNTER (OUTPATIENT)
Dept: PHYSICAL THERAPY | Age: 88
Setting detail: THERAPIES SERIES
Discharge: HOME | End: 2023-05-17
Payer: MEDICARE

## 2023-05-17 PROCEDURE — 97110 THERAPEUTIC EXERCISES: CPT

## 2023-05-17 NOTE — PROGRESS NOTES
shoulder    X in shaded column indicates activity completed today   Modalities Parameters/  Location  Notes                     Manual Therapy Time/Technique  Notes                     Exercise/Intervention   Notes   Vestibular Education:   Provided handout on unilateral vestibular hypofunction; reviewed clinical findings with both Cori and her daughter; discussed prior vision therapy   Post Maneuver Precautions:      Review of HEP; vision therapy handouts        Sit-stand 10x      Gait training with FWW 2x50 ft      // bars: forward walking, side step 2x ea    X Cues for heel toe pattern   Anterior/posterior wt shifts; marches  10x    X    Doreen: forward step over and back 10x ea. X Cuing to lead with heel first    Seated LAQ and hip flexion with abd 15x ea. X    Seated DF 15x ea. Seated hip abd and add with ball 15x orange     NuStep 5 min Level 5   X    Alternating step tap (4 inch step) 10x      Airex: standing balance without UE support  2x15 sec        Eye/head movement 10x   Horizontal plane; seated and standing    3-way hip 10x bilaterally      X1 viewing sitting  2x15 sec             Gait training in parallel bars. X           Standing with head turns (horizontal and vertical) 10x        Oculomotor testing     (+) Head thrust test bilaterally  Saccades noted to the left     Review of goals; LEFS       Walking with RW with head turns every 3 steps 1x50 ft   Cuing needed to continue walking   Walking with  ft with 1 rest break        Specific Interventions Next Treatment: Initiate vestibular adaptation program with x1 viewing exercises; dynamic balance and gait activities     Activity/Treatment Tolerance:  [x]  Patient tolerated treatment well  []  Patient limited by fatigue  []  Patient limited by pain   []  Patient limited by medical complications  []  Other:     Assessment: Treatment interventions completed as recorded above. Patient tolerated treatment well.

## 2023-05-19 ENCOUNTER — HOSPITAL ENCOUNTER (OUTPATIENT)
Dept: PHYSICAL THERAPY | Age: 88
Setting detail: THERAPIES SERIES
Discharge: HOME | End: 2023-05-19
Payer: MEDICARE

## 2023-05-19 PROCEDURE — 97110 THERAPEUTIC EXERCISES: CPT

## 2023-05-19 NOTE — PROGRESS NOTES
balance     Short Term Goals:  Time Frame: 4 weeks  Cori will demonstrate and consistently use an ankle strategy to maintain standing balance with perturbations to her trunk to keep from falling. GOAL MET   Cori's DHI will improve to <26 indicating clinically significant improvement in disability related to dizziness as her VOR hypofunction resolves. NOT MET-D/C GOAL (5/11/23)  Cori will perform 10 sit-stands with minimal verbal or manual cuing and minimal UE support as her strength and stabilization improves. NOT MET   Lázaro Ramirez will walk 500ft+ with FWW consistently allowing greater independence with community ambulation. NOT MET       Long Term Goals:  Time Frame: 8+ weeks  Cori will be discharged from PT with independent HEP to maintain all gains achieved in therapy. Lázaro Ramirez will report 50% improvement in balance, wave like sensation and dizziness thereby providing greater confidence in her gait and decrease in feeling off balance. NOT MET   Cori's DHI will improve to <15 indicating minimal disability related to her dizziness. NOT MET DISCHARGE GOAL (5/11/23)      Patient Education:   []  HEP/Education Completed: Plan of Care, Goals, Unilateral VOR hypofunction  [facility] Access Code:  []  No new Education completed  [x]  Reviewed Prior HEP      [x]  Patient verbalized and/or demonstrated understanding of education provided. []  Patient unable to verbalize and/or demonstrate understanding of education provided. Will continue education. [x]  Barriers to learning: n/a    PLAN:  Treatment Recommendations: Strengthening, Range of Motion, Balance Training, Functional Mobility Training, Transfer Training, Endurance Training, Gait Training, Stair Training, Neuromuscular Re-education, Manual Therapy - Soft Tissue Mobilization, Manual Therapy - Joint Manipulation, Pain Management, Home Exercise Program, Patient Education, Vestibular Rehabilitation, and Modalities    []  Plan of care initiated.   Plan to see patient 2

## 2023-05-22 ENCOUNTER — HOSPITAL ENCOUNTER (OUTPATIENT)
Dept: PHYSICAL THERAPY | Age: 88
Setting detail: THERAPIES SERIES
Discharge: HOME | End: 2023-05-22
Payer: MEDICARE

## 2023-05-22 PROCEDURE — 97110 THERAPEUTIC EXERCISES: CPT

## 2023-05-22 NOTE — PROGRESS NOTES
[facility] [address]  PHYSICAL THERAPY  [] VESTIBULAR EVALUATION  [x] DAILY NOTE [] PROGRESS NOTE [] DISCHARGE NOTE    [] [facility] [place]   [] [address]    [] [address] [place]   [x] Torrey Hunt    Date: 2023  Patient Name:  Courtney Garcia  : 1935*  MRN: [id number]  CSN: [id number]    Referring Practitioner Kayleigh Toussaint*   Diagnosis At high risk for falls [Z91.81]  Vertigo [R42]    Treatment Diagnosis Difficulty walking; imbalance    Date of Evaluation 23   Additional Pertinent History She has a history of 2 new hips and knees; HTN; vertigo; OA      Functional Outcome Measure Used [facility]   Functional Outcome Score 44/96 or 46% disability (23)  56/100 or 56% disability (3/14/23)   56/100 or 56% disability (23)      Insurance: Primary: Payor: Aristeo García /  /  / , $40 co-pay per visit  Secondary:    Authorization Information: Pre-certification    Visit # 20, 4/10 for progress note   Visits Allowed: Based on medical necessity   Recertification Date:    Physician Follow-Up:    Physician Orders:    History of Present Illness: Ishmael Parmar was diagnosed with down beating nystagmus years ago. She has been seen by various specialists and was referred to PT at one time to address her balance. This last , Sherice slipped off the toilet and she was told that she broke her left femur. She underwent surgery on  at [facility] and was discharged to [facility] in [facility] for rehab. She was finally discharged home around December. She is currently living in a \"mother in law\" house at her daughters home in [facility] [facility] [place]. She is able to walk with a walker, however she feel really \"dizzy\". Prior to recent fall, she was always dizzy. SUBJECTIVE: Subjective reports of continued dizziness, worse than usual today.        TREATMENT   Precautions:    Pain: 4/10 L knee and hip, R shoulder    X in shaded column indicates activity completed today

## 2023-05-24 ENCOUNTER — HOSPITAL ENCOUNTER (OUTPATIENT)
Dept: PHYSICAL THERAPY | Age: 88
Setting detail: THERAPIES SERIES
Discharge: HOME | End: 2023-05-24
Payer: MEDICARE

## 2023-05-24 PROCEDURE — 97110 THERAPEUTIC EXERCISES: CPT

## 2023-05-24 NOTE — PROGRESS NOTES
[facility] [address]  PHYSICAL THERAPY  [] VESTIBULAR EVALUATION  [x] DAILY NOTE [] PROGRESS NOTE [] DISCHARGE NOTE    [] [facility] [place]   [] [address]    [] [address] [employer]   [x] Arthkrystle Sawant    Date: 2023  Patient Name:  Casey Rodriguez  : 1935*  MRN: [id number]  CSN: [id number]    Referring Practitioner Mag Christianson*   Diagnosis At high risk for falls [Z91.81]  Vertigo [R42]    Treatment Diagnosis Difficulty walking; imbalance    Date of Evaluation 23   Additional Pertinent History She has a history of 2 new hips and knees; HTN; vertigo; OA      Functional Outcome Measure Used [facility]   Functional Outcome Score 44/96 or 46% disability (23)  56/100 or 56% disability (3/14/23)   56/100 or 56% disability (23)      Insurance: Primary: Payor: Julia Tijerina /  /  / , $40 co-pay per visit  Secondary:    Authorization Information: Pre-certification    Visit # 21, 5/10 for progress note   Visits Allowed: Based on medical necessity   Recertification Date:    Physician Follow-Up:    Physician Orders:    History of Present Illness: Kate Remy was diagnosed with down beating nystagmus years ago. She has been seen by various specialists and was referred to PT at one time to address her balance. This last , Sherice slipped off the toilet and she was told that she broke her left femur. She underwent surgery on  at [facility] and was discharged to [facility] in [facility] for rehab. She was finally discharged home around December. She is currently living in a \"mother in law\" house at her daughters home in [facility] [facility]. She is able to walk with a walker, however she feel really \"dizzy\". Prior to recent fall, she was always dizzy.       SUBJECTIVE: Pt stated she continues to have dizzyness and is wondering if she needs to go back to eye Dr. Soledad Hayes having more problems with L knee with increased pain in it with walking      TREATMENT   Precautions:

## 2023-05-31 ENCOUNTER — HOSPITAL ENCOUNTER (OUTPATIENT)
Dept: PHYSICAL THERAPY | Age: 88
Setting detail: THERAPIES SERIES
Discharge: HOME | End: 2023-05-31
Payer: MEDICARE

## 2023-05-31 PROCEDURE — 97116 GAIT TRAINING THERAPY: CPT

## 2023-05-31 PROCEDURE — 97110 THERAPEUTIC EXERCISES: CPT

## 2023-05-31 NOTE — PROGRESS NOTES
[facility] [address]  PHYSICAL THERAPY  [] VESTIBULAR EVALUATION  [x] DAILY NOTE [] PROGRESS NOTE [] DISCHARGE NOTE    [] OUTPATIENT [facility]HABILITATION CENTER [facility]   [] [place] [address]    [] [facility]   [x] Lázaro Ott    Date: 2023  Patient Name:  Isrrael Guerra \"Cori\"  : 1935*  MRN: [id number]  CSN: [id number]    Referring Practitioner Pili Giang*   Diagnosis At high risk for falls [Z91.81]  Vertigo [R42]    Treatment Diagnosis Difficulty walking; imbalance    Date of Evaluation 23   Additional Pertinent History She has a history of 2 new hips and knees; HTN; vertigo; OA      Functional Outcome Measure Used [facility]   Functional Outcome Score 44/96 or 46% disability (23)  56/100 or 56% disability (3/14/23)   56/100 or 56% disability (23)      Insurance: Primary: Payor: Ifeoma Goss /  /  / , $40 co-pay per visit  Secondary:    Authorization Information: Pre-certification    Visit # 22, 6/10 for progress note   Visits Allowed: Based on medical necessity   Recertification Date:    Physician Follow-Up:    Physician Orders:    History of Present Illness: Dayami Ford was diagnosed with down beating nystagmus years ago. She has been seen by various specialists and was referred to PT at one time to address her balance. This last , Sherice slipped off the toilet and she was told that she broke her left femur. She underwent surgery on  at [facility] and was discharged to [facility] in [facility] for rehab. She was finally discharged home around December. She is currently living in a \"mother in law\" house at her daughters home in [facility]. She is able to walk with a walker, however she feel really \"dizzy\". Prior to recent fall, she was always dizzy. SUBJECTIVE: Pt stated she started wearing a patch over her L eye because she thinks it helps decrease dizzyness.       TREATMENT   Precautions:    Pain: 0/10 L knee and hip,  3/10 R shoulder     X in

## 2023-06-02 ENCOUNTER — HOSPITAL ENCOUNTER (OUTPATIENT)
Dept: PHYSICAL THERAPY | Age: 88
Setting detail: THERAPIES SERIES
Discharge: HOME | End: 2023-06-02
Payer: MEDICARE

## 2023-06-02 PROCEDURE — 97530 THERAPEUTIC ACTIVITIES: CPT

## 2023-06-02 PROCEDURE — 97112 NEUROMUSCULAR REEDUCATION: CPT

## 2023-06-02 NOTE — PROGRESS NOTES
[facility] [address]  PHYSICAL THERAPY  [] VESTIBULAR EVALUATION  [x] DAILY NOTE [] PROGRESS NOTE [] DISCHARGE NOTE    [] OUTPATIENT REHABILITATION CENTER [facility]   [] [place] [address]    [] [facility]   [x] Demetria Arboleda    Date: 2023  Patient Name:  Cordell Aponte \"Cori\"  : 1935*  MRN: [id number]  CSN: [id number]    Referring Practitioner Liam Christiansen*   Diagnosis At high risk for falls [Z91.81]  Vertigo [R42]    Treatment Diagnosis Difficulty walking; imbalance    Date of Evaluation 23   Additional Pertinent History She has a history of 2 new hips and knees; HTN; vertigo; OA      Functional Outcome Measure Used [facility]   Functional Outcome Score 44/96 or 46% disability (23)  56/100 or 56% disability (3/14/23)   56/100 or 56% disability (23)      Insurance: Primary: Payor: [facility] /  /  / , $40 co-pay per visit  Secondary:    Authorization Information: Pre-certification    Visit # 23, 7/10 for progress note   Visits Allowed: Based on medical necessity   Recertification Date:    Physician Follow-Up:    Physician Orders:    History of Present Illness: Augustus Tompkins was diagnosed with down beating nystagmus years ago. She has been seen by various specialists and was referred to PT at one time to address her balance. This last , Sherice slipped off the toilet and she was told that she broke her left femur. She underwent surgery on  at [facility] and was discharged to [facility] in [facility] for rehab. She was finally discharged home around December. She is currently living in a \"mother in law\" house at her daughters home in [facility]. She is able to walk with a walker, however she feel really \"dizzy\". Prior to recent fall, she was always dizzy. SUBJECTIVE: Feels that the eye patch helps with her dizziness. Soanm Verdin was visibly upset today; frustrated with living situation, especially since she is off her normal schedule.       TREATMENT   Precautions:

## 2023-06-05 ENCOUNTER — HOSPITAL ENCOUNTER (OUTPATIENT)
Dept: PHYSICAL THERAPY | Age: 88
Setting detail: THERAPIES SERIES
Discharge: HOME | End: 2023-06-05
Payer: MEDICARE

## 2023-06-05 PROCEDURE — 97116 GAIT TRAINING THERAPY: CPT

## 2023-06-05 PROCEDURE — 97112 NEUROMUSCULAR REEDUCATION: CPT

## 2023-06-05 PROCEDURE — 97110 THERAPEUTIC EXERCISES: CPT

## 2023-06-05 NOTE — PROGRESS NOTES
[address] [address]  PHYSICAL THERAPY  [] VESTIBULAR EVALUATION  [x] DAILY NOTE [] PROGRESS NOTE [] DISCHARGE NOTE    [] OUTPATIENT REHABILITATION CENTER [facility]   [] [place] [address]    [] [facility]   [x] Peg Wynne    Date: 2023  Patient Name:  Cat Ulloa \"Cori\"  : 1935*  MRN: [id number]  CSN: [id number]    Referring Practitioner James Stanley*   Diagnosis At high risk for falls [Z91.81]  Vertigo [R42]    Treatment Diagnosis Difficulty walking; imbalance    Date of Evaluation 23   Additional Pertinent History She has a history of 2 new hips and knees; HTN; vertigo; OA      Functional Outcome Measure Used [facility]   Functional Outcome Score 44/96 or 46% disability (23)  56/100 or 56% disability (3/14/23)   56/100 or 56% disability (23)      Insurance: Primary: Payor: Le Cecily /  /  / , $40 co-pay per visit  Secondary:    Authorization Information: Pre-certification    Visit # 24, 8/10 for progress note   Visits Allowed: Based on medical necessity   Recertification Date:    Physician Follow-Up:    Physician Orders:    History of Present Illness: Julian Palm was diagnosed with down beating nystagmus years ago. She has been seen by various specialists and was referred to PT at one time to address her balance. This last , Sherice slipped off the toilet and she was told that she broke her left femur. She underwent surgery on  at [facility] and was discharged to [facility] in [facility] for rehab. She was finally discharged home around December. She is currently living in a \"mother in law\" house at her daughters home in [facility]. She is able to walk with a walker, however she feel really \"dizzy\". Prior to recent fall, she was always dizzy. SUBJECTIVE: Pt stated she continues to wear eye patch on L eye and feels it is still helping with decreasing dizziness.        TREATMENT   Precautions:    Pain: 0/10 L knee and hip,  3/10 R shoulder     X

## 2023-06-08 ENCOUNTER — HOSPITAL ENCOUNTER (OUTPATIENT)
Dept: PHYSICAL THERAPY | Age: 88
Setting detail: THERAPIES SERIES
Discharge: HOME | End: 2023-06-08
Payer: MEDICARE

## 2023-06-08 PROCEDURE — 97530 THERAPEUTIC ACTIVITIES: CPT

## 2023-06-08 PROCEDURE — 97112 NEUROMUSCULAR REEDUCATION: CPT

## 2023-06-08 PROCEDURE — 97110 THERAPEUTIC EXERCISES: CPT

## 2023-06-08 NOTE — PROGRESS NOTES
and she was told that she broke her left femur. She underwent surgery on June 28 at [facility] and was discharged to [facility] in [facility] for rehab. She was finally discharged home around December. She is currently living in a \"mother in law\" house at her daughters home in [facility]. She is able to walk with a walker, however she feel really \"dizzy\". Prior to recent fall, she was always dizzy. SUBJECTIVE: Doing well; denies any falls, trips    TREATMENT   Precautions:    Pain: 0/10 L knee and hip,  3/10 R shoulder     X in shaded column indicates activity completed today   Modalities Parameters/  Location  Notes                     Manual Therapy Time/Technique  Notes                     Exercise/Intervention   Notes   Vestibular Education:   Provided handout on unilateral vestibular hypofunction; reviewed clinical findings with both Cori and her daughter; discussed prior vision therapy   Post Maneuver Precautions:      Review of HEP; vision therapy handouts        Sit-stand 10x  x    // bars: forward walking, side step 2x ea    X Cues for heel toe pattern   Anterior/posterior wt shifts; marches  10x    X    Doreen: forward step over and back 2x     x Step together; alternating; side step   Seated LAQ and hip flexion with abd 15x ea. Seated HS curls with orange band 15x      Seated DF 15x ea. Seated hip abd and add with ball 15x orange       NuStep 6 min Level 5       Alternating step tap (6 inch step) 10x      Airex: standing balance  20sec 2x   x CGA   Airex: one ft on foam one ft off 20 sec 1x     Eye/head movement 10x   Horizontal plane; seated    3-way hip 10x bilaterally      X1 viewing sitting  2x15 sec             Gait training in parallel bars.       x           Standing with head turns (horizontal and vertical) 10x    x    Oculomotor testing     (+) Head thrust test bilaterally  Saccades noted to the left     Review of goals; LEFS   x    Walking with RW with head turns every 3 steps 1x50 ft

## 2023-06-19 ENCOUNTER — HOSPITAL ENCOUNTER (OUTPATIENT)
Dept: PHYSICAL THERAPY | Age: 88
Setting detail: THERAPIES SERIES
Discharge: HOME | End: 2023-06-19
Payer: MEDICARE

## 2023-06-19 PROCEDURE — 97110 THERAPEUTIC EXERCISES: CPT

## 2023-06-19 PROCEDURE — 97116 GAIT TRAINING THERAPY: CPT

## 2023-06-19 NOTE — PROGRESS NOTES
hip,  5/10 R shoulder     X in shaded column indicates activity completed today   Modalities Parameters/  Location  Notes                     Manual Therapy Time/Technique  Notes                     Exercise/Intervention   Notes   Vestibular Education:   Provided handout on unilateral vestibular hypofunction; reviewed clinical findings with both Cori and her daughter; discussed prior vision therapy   Post Maneuver Precautions:      Review of HEP; vision therapy handouts        Sit-stand 10x  x    // bars: forward walking, side step 2x ea    X Cues for heel toe pattern   Anterior/posterior wt shifts; marches  10x    X    Doreen: forward step over and back 2x      Step together; alternating; side step   Seated LAQ and hip flexion with abd 15x ea. x    Seated HS curls with orange band 15x      Seated DF 15x ea. x    Seated hip abd and add with ball 15x orange       NuStep 6 min Level 5   x    Alternating step tap (6 inch step) 10x      Airex: standing balance  20sec 2x   x Finger tips   Airex: one ft on foam one ft off 20 sec 1x x    Eye/head movement 10x   Horizontal plane; seated    3-way hip 10x bilaterally  x    X1 viewing sitting  2x15 sec             Gait training in parallel bars.                  Standing with head turns (horizontal and vertical) 10x        Oculomotor testing     (+) Head thrust test bilaterally  Saccades noted to the left     Review of goals; LEFS       Walking with RW with head turns every 3 steps 1x50 ft   Cuing needed to continue walking   Walking with GUANACO Escobedo Transition from clinic floor to [facility] floor; Walked from clinic out to the curb 500 ft x      Specific Interventions Next Treatment: Initiate vestibular adaptation program with x1 viewing exercises; dynamic balance and gait activities     Activity/Treatment Tolerance:  [x]  Patient tolerated treatment well  []  Patient limited by fatigue  []  Patient limited by pain   []  Patient limited by medical complications  []

## 2023-06-26 ENCOUNTER — HOSPITAL ENCOUNTER (OUTPATIENT)
Dept: PHYSICAL THERAPY | Age: 88
Setting detail: THERAPIES SERIES
End: 2023-06-26
Payer: MEDICARE

## 2023-07-06 ENCOUNTER — HOSPITAL ENCOUNTER (OUTPATIENT)
Dept: PHYSICAL THERAPY | Age: 88
Setting detail: THERAPIES SERIES
Discharge: HOME | End: 2023-07-06
Payer: MEDICARE

## 2023-07-06 PROCEDURE — 97112 NEUROMUSCULAR REEDUCATION: CPT

## 2023-07-06 PROCEDURE — 97110 THERAPEUTIC EXERCISES: CPT

## 2023-07-06 PROCEDURE — 97530 THERAPEUTIC ACTIVITIES: CPT

## 2023-07-10 ENCOUNTER — HOSPITAL ENCOUNTER (OUTPATIENT)
Dept: PHYSICAL THERAPY | Age: 88
Setting detail: THERAPIES SERIES
Discharge: HOME | End: 2023-07-10
Payer: MEDICARE

## 2023-07-10 PROCEDURE — 97112 NEUROMUSCULAR REEDUCATION: CPT

## 2023-07-10 PROCEDURE — 97530 THERAPEUTIC ACTIVITIES: CPT

## 2023-07-10 NOTE — DISCHARGE SUMMARY
Alex  PHYSICAL THERAPY  [] VESTIBULAR EVALUATION  [] DAILY NOTE [] PROGRESS NOTE [x] DISCHARGE NOTE    [] OUTPATIENT REHABILITATION [place]   [] [facility]    [] [facility]   [x] Rhianna Pemberton    Date: 7/10/2023  Patient Name:  Sawyer Zelaya \"Cori\"  : 1935*  MRN: [id number]  CSN: [id number]    Referring Practitioner Grace Hummel*   Diagnosis At high risk for falls [Z91.81]  Vertigo [R42]    Treatment Diagnosis Difficulty walking; imbalance    Date of Evaluation 23   Additional Pertinent History She has a history of 2 new hips and knees; HTN; vertigo; OA      Functional Outcome Measure Used [facility]   Functional Outcome Score 44/96 or 46% disability (23)  56/100 or 56% disability (3/14/23)   56/100 or 56% disability (23)      Insurance: Primary: Payor: Kiya Sosa /  /  / , $40 co-pay per visit  Secondary:    Authorization Information: Pre-certification    Visit # 34, 1/10 for progress note   Visits Allowed: Based on medical necessity   Recertification Date: August 3, 23   Physician Follow-Up:    Physician Orders:    History of Present Illness: Sylvia Andrews was diagnosed with down beating nystagmus years ago. She has been seen by various specialists and was referred to PT at one time to address her balance. This last , Sherice slipped off the toilet and she was told that she broke her left femur. She underwent surgery on  at [facility] and was discharged to [facility] in [place] for rehab. She was finally discharged home around December. She is currently living in a \"mother in law\" house at her daughters home in [place]. She is able to walk with a walker, however she feel really \"dizzy\". Prior to recent fall, she was always dizzy. SUBJECTIVE: Walked in again with her upright rollator; she got a little dog over the weekend.  Struggling with increase in dizziness today and isn't worth a darn thing today    TREATMENT   Precautions:

## 2024-04-17 ENCOUNTER — HOSPITAL ENCOUNTER (INPATIENT)
Age: 89
LOS: 11 days | Discharge: HOME HEALTH | DRG: 871 | End: 2024-04-28
Attending: INTERNAL MEDICINE | Admitting: INTERNAL MEDICINE
Payer: MEDICARE

## 2024-04-17 ENCOUNTER — APPOINTMENT (OUTPATIENT)
Dept: GENERAL RADIOLOGY | Age: 89
DRG: 871 | End: 2024-04-17
Attending: INTERNAL MEDICINE
Payer: MEDICARE

## 2024-04-17 ENCOUNTER — APPOINTMENT (OUTPATIENT)
Dept: CT IMAGING | Age: 89
DRG: 871 | End: 2024-04-17
Attending: INTERNAL MEDICINE
Payer: MEDICARE

## 2024-04-17 DIAGNOSIS — I25.10 ARTERIOSCLEROTIC CORONARY ARTERY DISEASE: ICD-10-CM

## 2024-04-17 DIAGNOSIS — R65.21 SEPTIC SHOCK (HCC): Primary | ICD-10-CM

## 2024-04-17 DIAGNOSIS — A41.9 SEPTIC SHOCK (HCC): Primary | ICD-10-CM

## 2024-04-17 LAB
ALBUMIN SERPL BCG-MCNC: 3.2 G/DL (ref 3.5–5.1)
ALP SERPL-CCNC: 53 U/L (ref 38–126)
ALT SERPL W/O P-5'-P-CCNC: 31 U/L (ref 11–66)
ANION GAP SERPL CALC-SCNC: 14 MEQ/L (ref 8–16)
ANISOCYTOSIS BLD QL SMEAR: PRESENT
APTT PPP: 36.2 SECONDS (ref 22–38)
AST SERPL-CCNC: 43 U/L (ref 5–40)
BACTERIA: ABNORMAL
BASOPHILS ABSOLUTE: 0.1 THOU/MM3 (ref 0–0.1)
BASOPHILS NFR BLD AUTO: 0.6 %
BILIRUB SERPL-MCNC: 1.3 MG/DL (ref 0.3–1.2)
BILIRUB UR QL STRIP: NEGATIVE
BUN SERPL-MCNC: 29 MG/DL (ref 7–22)
CA-I BLD ISE-SCNC: 1.1 MMOL/L (ref 1.12–1.32)
CALCIUM SERPL-MCNC: 8.2 MG/DL (ref 8.5–10.5)
CASTS #/AREA URNS LPF: ABNORMAL /LPF
CASTS #/AREA URNS LPF: ABNORMAL /LPF
CHARACTER UR: ABNORMAL
CHARCOAL URNS QL MICRO: ABNORMAL
CHLORIDE SERPL-SCNC: 102 MEQ/L (ref 98–111)
CO2 SERPL-SCNC: 18 MEQ/L (ref 23–33)
COLOR UR: ABNORMAL
CORTIS SERPL-MCNC: 36.56 UG/DL
CORTISOL COLLECTION INFO: NORMAL
CREAT SERPL-MCNC: 1.9 MG/DL (ref 0.4–1.2)
CRYSTALS URNS QL MICRO: ABNORMAL
DACROCYTES: ABNORMAL
DEPRECATED RDW RBC AUTO: 69.1 FL (ref 35–45)
ELLIPTOCYTES: ABNORMAL
EOSINOPHIL NFR BLD AUTO: 0.6 %
EOSINOPHILS ABSOLUTE: 0.1 THOU/MM3 (ref 0–0.4)
EPITHELIAL CELLS, UA: ABNORMAL /HPF
ERYTHROCYTE [DISTWIDTH] IN BLOOD BY AUTOMATED COUNT: 18.3 % (ref 11.5–14.5)
FLUAV RNA RESP QL NAA+PROBE: NOT DETECTED
FLUBV RNA RESP QL NAA+PROBE: NOT DETECTED
GFR SERPL CREATININE-BSD FRML MDRD: 25 ML/MIN/1.73M2
GLUCOSE SERPL-MCNC: 153 MG/DL (ref 70–108)
GLUCOSE UR QL STRIP.AUTO: NEGATIVE MG/DL
HCT VFR BLD AUTO: 29.5 % (ref 37–47)
HGB BLD-MCNC: 9 GM/DL (ref 12–16)
HGB UR QL STRIP.AUTO: ABNORMAL
IMM GRANULOCYTES # BLD AUTO: 0.43 THOU/MM3 (ref 0–0.07)
IMM GRANULOCYTES NFR BLD AUTO: 3.5 %
INR PPP: 2.26 (ref 0.85–1.13)
KETONES UR QL STRIP.AUTO: ABNORMAL
LACTATE SERPL-SCNC: 1.4 MMOL/L (ref 0.5–2)
LACTIC ACID, SEPSIS: 3 MMOL/L (ref 0.5–1.9)
LACTIC ACID, SEPSIS: 3.4 MMOL/L (ref 0.5–1.9)
LEUKOCYTE ESTERASE UR QL STRIP.AUTO: ABNORMAL
LYMPHOCYTES ABSOLUTE: 0.4 THOU/MM3 (ref 1–4.8)
LYMPHOCYTES NFR BLD AUTO: 2.9 %
MAGNESIUM SERPL-MCNC: 1.8 MG/DL (ref 1.6–2.4)
MCH RBC QN AUTO: 31.7 PG (ref 26–33)
MCHC RBC AUTO-ENTMCNC: 30.5 GM/DL (ref 32.2–35.5)
MCV RBC AUTO: 103.9 FL (ref 81–99)
MONOCYTES ABSOLUTE: 0.4 THOU/MM3 (ref 0.4–1.3)
MONOCYTES NFR BLD AUTO: 3.4 %
MRSA DNA SPEC QL NAA+PROBE: POSITIVE
NEUTROPHILS NFR BLD AUTO: 89 %
NITRITE UR QL STRIP.AUTO: NEGATIVE
NRBC BLD AUTO-RTO: 0 /100 WBC
PATHOLOGIST REVIEW: ABNORMAL
PH UR STRIP.AUTO: 5 [PH] (ref 5–9)
PHOSPHATE SERPL-MCNC: 3.8 MG/DL (ref 2.4–4.7)
PLATELET # BLD AUTO: 65 THOU/MM3 (ref 130–400)
PLATELET BLD QL SMEAR: ABNORMAL
PMV BLD AUTO: 13.3 FL (ref 9.4–12.4)
POIKILOCYTES: ABNORMAL
POTASSIUM SERPL-SCNC: 4.7 MEQ/L (ref 3.5–5.2)
PROT SERPL-MCNC: 5.7 G/DL (ref 6.1–8)
PROT UR STRIP.AUTO-MCNC: 100 MG/DL
RBC # BLD AUTO: 2.84 MILL/MM3 (ref 4.2–5.4)
RBC #/AREA URNS HPF: ABNORMAL /HPF
RENAL EPI CELLS #/AREA URNS HPF: ABNORMAL /[HPF]
SARS-COV-2 RNA RESP QL NAA+PROBE: NOT DETECTED
SCAN OF BLOOD SMEAR: NORMAL
SEGMENTED NEUTROPHILS ABSOLUTE COUNT: 10.9 THOU/MM3 (ref 1.8–7.7)
SODIUM SERPL-SCNC: 134 MEQ/L (ref 135–145)
SPECIFIC GRAVITY UA: 1.02 (ref 1–1.03)
T4 FREE SERPL-MCNC: 1.27 NG/DL (ref 0.93–1.68)
TROPONIN, HIGH SENSITIVITY: 258 NG/L (ref 0–12)
TROPONIN, HIGH SENSITIVITY: 261 NG/L (ref 0–12)
TSH SERPL DL<=0.005 MIU/L-ACNC: 2.1 UIU/ML (ref 0.4–4.2)
UROBILINOGEN, URINE: 1 EU/DL (ref 0–1)
VANCOMYCIN SERPL-MCNC: 8.3 UG/ML (ref 0.1–39.9)
WBC # BLD AUTO: 12.3 THOU/MM3 (ref 4.8–10.8)
WBC #/AREA URNS HPF: ABNORMAL /HPF
YEAST LIKE FUNGI URNS QL MICRO: ABNORMAL

## 2024-04-17 PROCEDURE — 6370000000 HC RX 637 (ALT 250 FOR IP): Performed by: NURSE PRACTITIONER

## 2024-04-17 PROCEDURE — 83605 ASSAY OF LACTIC ACID: CPT

## 2024-04-17 PROCEDURE — 84484 ASSAY OF TROPONIN QUANT: CPT

## 2024-04-17 PROCEDURE — 36620 INSERTION CATHETER ARTERY: CPT

## 2024-04-17 PROCEDURE — 6360000002 HC RX W HCPCS

## 2024-04-17 PROCEDURE — 02HV33Z INSERTION OF INFUSION DEVICE INTO SUPERIOR VENA CAVA, PERCUTANEOUS APPROACH: ICD-10-PCS | Performed by: INTERNAL MEDICINE

## 2024-04-17 PROCEDURE — 85610 PROTHROMBIN TIME: CPT

## 2024-04-17 PROCEDURE — 93010 ELECTROCARDIOGRAM REPORT: CPT | Performed by: INTERNAL MEDICINE

## 2024-04-17 PROCEDURE — 83735 ASSAY OF MAGNESIUM: CPT

## 2024-04-17 PROCEDURE — 2580000003 HC RX 258: Performed by: NURSE PRACTITIONER

## 2024-04-17 PROCEDURE — 87186 SC STD MICRODIL/AGAR DIL: CPT

## 2024-04-17 PROCEDURE — 3E033XZ INTRODUCTION OF VASOPRESSOR INTO PERIPHERAL VEIN, PERCUTANEOUS APPROACH: ICD-10-PCS | Performed by: INTERNAL MEDICINE

## 2024-04-17 PROCEDURE — 03HY32Z INSERTION OF MONITORING DEVICE INTO UPPER ARTERY, PERCUTANEOUS APPROACH: ICD-10-PCS | Performed by: INTERNAL MEDICINE

## 2024-04-17 PROCEDURE — 85025 COMPLETE CBC W/AUTO DIFF WBC: CPT

## 2024-04-17 PROCEDURE — 36556 INSERT NON-TUNNEL CV CATH: CPT | Performed by: INTERNAL MEDICINE

## 2024-04-17 PROCEDURE — 87040 BLOOD CULTURE FOR BACTERIA: CPT

## 2024-04-17 PROCEDURE — 87636 SARSCOV2 & INF A&B AMP PRB: CPT

## 2024-04-17 PROCEDURE — 2580000003 HC RX 258

## 2024-04-17 PROCEDURE — 2500000003 HC RX 250 WO HCPCS: Performed by: NURSE PRACTITIONER

## 2024-04-17 PROCEDURE — 87641 MR-STAPH DNA AMP PROBE: CPT

## 2024-04-17 PROCEDURE — 36415 COLL VENOUS BLD VENIPUNCTURE: CPT

## 2024-04-17 PROCEDURE — 2500000003 HC RX 250 WO HCPCS

## 2024-04-17 PROCEDURE — 99291 CRITICAL CARE FIRST HOUR: CPT | Performed by: INTERNAL MEDICINE

## 2024-04-17 PROCEDURE — 80053 COMPREHEN METABOLIC PANEL: CPT

## 2024-04-17 PROCEDURE — 6360000002 HC RX W HCPCS: Performed by: NURSE PRACTITIONER

## 2024-04-17 PROCEDURE — 81001 URINALYSIS AUTO W/SCOPE: CPT

## 2024-04-17 PROCEDURE — 6370000000 HC RX 637 (ALT 250 FOR IP)

## 2024-04-17 PROCEDURE — 82533 TOTAL CORTISOL: CPT

## 2024-04-17 PROCEDURE — 36556 INSERT NON-TUNNEL CV CATH: CPT

## 2024-04-17 PROCEDURE — 84443 ASSAY THYROID STIM HORMONE: CPT

## 2024-04-17 PROCEDURE — 70450 CT HEAD/BRAIN W/O DYE: CPT

## 2024-04-17 PROCEDURE — 2000000000 HC ICU R&B

## 2024-04-17 PROCEDURE — 87086 URINE CULTURE/COLONY COUNT: CPT

## 2024-04-17 PROCEDURE — 84100 ASSAY OF PHOSPHORUS: CPT

## 2024-04-17 PROCEDURE — 93005 ELECTROCARDIOGRAM TRACING: CPT | Performed by: NURSE PRACTITIONER

## 2024-04-17 PROCEDURE — 87077 CULTURE AEROBIC IDENTIFY: CPT

## 2024-04-17 PROCEDURE — 84439 ASSAY OF FREE THYROXINE: CPT

## 2024-04-17 PROCEDURE — 71045 X-RAY EXAM CHEST 1 VIEW: CPT

## 2024-04-17 PROCEDURE — 85730 THROMBOPLASTIN TIME PARTIAL: CPT

## 2024-04-17 PROCEDURE — 82330 ASSAY OF CALCIUM: CPT

## 2024-04-17 PROCEDURE — 80202 ASSAY OF VANCOMYCIN: CPT

## 2024-04-17 RX ORDER — CALCIUM GLUCONATE 10 MG/ML
1000 INJECTION, SOLUTION INTRAVENOUS ONCE
Status: COMPLETED | OUTPATIENT
Start: 2024-04-17 | End: 2024-04-17

## 2024-04-17 RX ORDER — SODIUM CHLORIDE 0.9 % (FLUSH) 0.9 %
5-40 SYRINGE (ML) INJECTION EVERY 12 HOURS SCHEDULED
Status: DISCONTINUED | OUTPATIENT
Start: 2024-04-17 | End: 2024-04-28 | Stop reason: HOSPADM

## 2024-04-17 RX ORDER — ENOXAPARIN SODIUM 100 MG/ML
40 INJECTION SUBCUTANEOUS DAILY
Status: DISCONTINUED | OUTPATIENT
Start: 2024-04-17 | End: 2024-04-17

## 2024-04-17 RX ORDER — SODIUM CHLORIDE 9 MG/ML
INJECTION, SOLUTION INTRAVENOUS PRN
Status: DISCONTINUED | OUTPATIENT
Start: 2024-04-17 | End: 2024-04-28 | Stop reason: HOSPADM

## 2024-04-17 RX ORDER — ACETAMINOPHEN 650 MG/1
650 SUPPOSITORY RECTAL EVERY 6 HOURS PRN
Status: DISCONTINUED | OUTPATIENT
Start: 2024-04-17 | End: 2024-04-28 | Stop reason: HOSPADM

## 2024-04-17 RX ORDER — ACETAMINOPHEN 325 MG/1
650 TABLET ORAL EVERY 6 HOURS PRN
Status: DISCONTINUED | OUTPATIENT
Start: 2024-04-17 | End: 2024-04-28 | Stop reason: HOSPADM

## 2024-04-17 RX ORDER — PANTOPRAZOLE SODIUM 40 MG/1
40 TABLET, DELAYED RELEASE ORAL DAILY
Status: DISCONTINUED | OUTPATIENT
Start: 2024-04-17 | End: 2024-04-28 | Stop reason: HOSPADM

## 2024-04-17 RX ORDER — FAMOTIDINE 20 MG/1
20 TABLET, FILM COATED ORAL 2 TIMES DAILY
Status: DISCONTINUED | OUTPATIENT
Start: 2024-04-17 | End: 2024-04-17

## 2024-04-17 RX ORDER — METOPROLOL SUCCINATE 25 MG/1
25 TABLET, EXTENDED RELEASE ORAL DAILY
COMMUNITY

## 2024-04-17 RX ORDER — NOREPINEPHRINE BITARTRATE 0.06 MG/ML
1-100 INJECTION, SOLUTION INTRAVENOUS CONTINUOUS
Status: DISCONTINUED | OUTPATIENT
Start: 2024-04-17 | End: 2024-04-18

## 2024-04-17 RX ORDER — LEVOTHYROXINE SODIUM 88 UG/1
88 TABLET ORAL DAILY
Status: DISCONTINUED | OUTPATIENT
Start: 2024-04-17 | End: 2024-04-28 | Stop reason: HOSPADM

## 2024-04-17 RX ORDER — SODIUM CHLORIDE 0.9 % (FLUSH) 0.9 %
5-40 SYRINGE (ML) INJECTION PRN
Status: DISCONTINUED | OUTPATIENT
Start: 2024-04-17 | End: 2024-04-28 | Stop reason: HOSPADM

## 2024-04-17 RX ORDER — NOREPINEPHRINE BITARTRATE 0.06 MG/ML
INJECTION, SOLUTION INTRAVENOUS
Status: COMPLETED
Start: 2024-04-17 | End: 2024-04-17

## 2024-04-17 RX ORDER — SODIUM CHLORIDE 9 MG/ML
INJECTION, SOLUTION INTRAVENOUS CONTINUOUS
Status: ACTIVE | OUTPATIENT
Start: 2024-04-17 | End: 2024-04-17

## 2024-04-17 RX ADMIN — ACETAMINOPHEN 650 MG: 325 TABLET ORAL at 08:10

## 2024-04-17 RX ADMIN — CALCIUM GLUCONATE 1000 MG: 10 INJECTION, SOLUTION INTRAVENOUS at 06:48

## 2024-04-17 RX ADMIN — HYDROCORTISONE SODIUM SUCCINATE 100 MG: 100 INJECTION, POWDER, FOR SOLUTION INTRAMUSCULAR; INTRAVENOUS at 14:01

## 2024-04-17 RX ADMIN — SODIUM CHLORIDE, PRESERVATIVE FREE 10 ML: 5 INJECTION INTRAVENOUS at 21:39

## 2024-04-17 RX ADMIN — VANCOMYCIN HYDROCHLORIDE 1250 MG: 5 INJECTION, POWDER, LYOPHILIZED, FOR SOLUTION INTRAVENOUS at 17:32

## 2024-04-17 RX ADMIN — HYDROCORTISONE SODIUM SUCCINATE 100 MG: 100 INJECTION, POWDER, FOR SOLUTION INTRAMUSCULAR; INTRAVENOUS at 06:41

## 2024-04-17 RX ADMIN — SODIUM CHLORIDE: 9 INJECTION, SOLUTION INTRAVENOUS at 04:57

## 2024-04-17 RX ADMIN — Medication 2 MCG/MIN: at 04:59

## 2024-04-17 RX ADMIN — NOREPINEPHRINE BITARTRATE 2 MCG/MIN: 0.06 INJECTION, SOLUTION INTRAVENOUS at 04:59

## 2024-04-17 RX ADMIN — ACETAMINOPHEN 650 MG: 325 TABLET ORAL at 21:37

## 2024-04-17 RX ADMIN — CEFTRIAXONE SODIUM 2000 MG: 2 INJECTION, POWDER, FOR SOLUTION INTRAMUSCULAR; INTRAVENOUS at 16:58

## 2024-04-17 RX ADMIN — HYDROCORTISONE SODIUM SUCCINATE 100 MG: 100 INJECTION, POWDER, FOR SOLUTION INTRAMUSCULAR; INTRAVENOUS at 21:38

## 2024-04-17 RX ADMIN — LEVOTHYROXINE SODIUM 88 MCG: 0.09 TABLET ORAL at 10:40

## 2024-04-17 RX ADMIN — ACETAMINOPHEN 650 MG: 325 TABLET ORAL at 14:01

## 2024-04-17 RX ADMIN — PANTOPRAZOLE SODIUM 40 MG: 40 TABLET, DELAYED RELEASE ORAL at 08:12

## 2024-04-17 RX ADMIN — RIVAROXABAN 15 MG: 15 TABLET, FILM COATED ORAL at 10:40

## 2024-04-17 ASSESSMENT — PAIN DESCRIPTION - ONSET
ONSET: ON-GOING
ONSET: PROGRESSIVE

## 2024-04-17 ASSESSMENT — PAIN DESCRIPTION - DESCRIPTORS
DESCRIPTORS: ACHING
DESCRIPTORS: ACHING;DULL

## 2024-04-17 ASSESSMENT — PAIN DESCRIPTION - LOCATION
LOCATION: HEAD

## 2024-04-17 ASSESSMENT — PAIN - FUNCTIONAL ASSESSMENT
PAIN_FUNCTIONAL_ASSESSMENT: ACTIVITIES ARE NOT PREVENTED
PAIN_FUNCTIONAL_ASSESSMENT: ACTIVITIES ARE NOT PREVENTED

## 2024-04-17 ASSESSMENT — PAIN DESCRIPTION - FREQUENCY
FREQUENCY: INTERMITTENT
FREQUENCY: CONTINUOUS

## 2024-04-17 ASSESSMENT — PAIN SCALES - GENERAL
PAINLEVEL_OUTOF10: 0
PAINLEVEL_OUTOF10: 3
PAINLEVEL_OUTOF10: 3

## 2024-04-17 ASSESSMENT — PAIN DESCRIPTION - ORIENTATION: ORIENTATION: MID

## 2024-04-17 ASSESSMENT — PAIN DESCRIPTION - PAIN TYPE
TYPE: ACUTE PAIN
TYPE: ACUTE PAIN

## 2024-04-17 NOTE — PROCEDURES
ARTERIAL LINE PROCEDURE NOTES  [facility]  Department of Critical Care Medicine      PATIENT: Sherice Connolly, 1935, 89 y.o., female    MRN: [id number]    DATE: April 17, 2024    Nurse Practitioner: CRISTIANE Lieberman CNP    Procedure: Right radial arterial line placement.     Indications: Continuous monitoring of blood pressure in a patient with hypotension + shock, on Levophed. Cannot use bilateral upper arm for blood pressure check due to patient had hx of bilateral mastectomy.      Anesthesia: Local infiltration of 1% lidocaine.     Consent:  The family member (daughter) were counseled regarding the procedure, its indications, risks, potential complications and alternatives, and any questions were answered. Consent was obtained to proceed.    Technique: Time Out: Immediately prior to the procedure a \"timeout\" was called to verify the correct patient and procedure. Procedure was done using strict aseptic technique. Shaheed's test was performed and was normal. Right radial site was cleaned with chloraprep and draped.  Radial artery was identified, then Lidocaine 1% was infiltrated locally.  Radial arterial line was inserted, a good blood flow was obtained, after which guidewire was inserted all the way with no resistance. Then the canula was inserted and needle with guidewire was withdrawn. Pulsatile bright red blood flow was observed. The canula was connected to BP monitoring apparatus and a good quality waveform was noted. Then the canula was secured with 2 stay sutures of 3-0 silk after Lidocaine infiltration, following which dressing was applied. The patient tolerated the procedure.      Number of sticks: 1.     Number of Kits used: 1.     Complications: No immediate complication.      Estimated blood loss: About < 1 ml.     Comment: Patient tolerated the procedure well.     AVEL LiebermanBC   Critical Care  4/17/2024 5:32 AM    
EKG was completed and handed to Jeremías WEISS  
techniques: sterile gel and sterile probe covers were used  Number of attempts: 1  Successful placement: yes  Post-procedure: line sutured and dressing applied  Assessment: blood return through all ports, placement verified by x-ray, free fluid flow and no pneumothorax on x-ray  Patient tolerance: patient tolerated the procedure well with no immediate complications

## 2024-04-17 NOTE — H&P
Critical Care H&P Note      Patient:  Sherice Connolly    Unit/Bed:4D-07/007-A  MRN: [id number]   PCP: Dania Boswell APRN - CNP  Date of Admission: 4/17/2024    Chief Complaint: Confusion    Assessment and Plan (All pulmonary edema, renal failure, PE, and respiratory failure diagnoses are acute in nature unless otherwise specified):        Septic shock, likely urosepsis: Presented to ED for worsening confusion found to be septic given 1.5 L IVF requiring levo-RIJ CVC placed 4/17 in ICU.  Vancomycin, Zosyn-started 4/16 in ED, transitioned to Rocephin  Solu-Cortef 100 mg every 8 hours for 7 days   cc/HR  Breast cancer, s/p bilateral mastectomy-chemo currently: Noted, right radial arterial line placed as patient unable to tolerate upper arm BP cuff on either side.  LESLIE: Cr 1.9 from baseline of CR 1.21-year prior.  IVF, monitor urine output, daily BMP.  Hx CHF/cardiomegaly: Nuclear med cardiac MUGA scan (4/9/2024) -EF 44% within normal lower limits, no evidence of left ventricular enlargement, and systolic and end-diastolic volumes are within normal limits.  A-fib: On Xarelto for anticoagulation outpatient.  Restarted inpatient per pharmacy to dose.  Hypothyroidism: Reordered home med levothyroxine 88 mcg daily.  Elevated troponin: Elevated in 200s stable patient denies chest pain EKG shows A-fib no RVR no ST elevation or depression.  Cardiology consulted for potential NSTEMI.  Patient anticoagulated on Xarelto.    HPI and ICU Course: Patient is Sherice Connolly 89-year-old female presenting to ICU in septic shock as a transfer from [facility].    Patient originally presented to [facility] ED for chief complaint of confusion.  Patient's sister said that she was fine in the morning, mental status decreased throughout the day.  She has had chemo treatment 5 days prior.  Denies emesis, diarrhea, cough, congestion.  History of breast cancer s/p double mastectomy in October 2023.    While in their ED

## 2024-04-17 NOTE — CARE COORDINATION
Case Management Assessment Initial Evaluation    Date/Time of Evaluation: 2024 8:34 AM  Assessment Completed by: Ashanti Tucker RN    If patient is discharged prior to next notation, then this note serves as note for discharge by case management.    Patient Name: Sherice Connolly                   YOB: 1935  Diagnosis: Sepsis (HCC) [A41.9]                   Date / Time: 2024  4:02 AM  Location: [address] [place] [address]     Patient Admission Status: Inpatient   Readmission Risk Low 0-14, Mod 15-19), High > 20: No data recorded  Current PCP: Dania Boswell, APRN - CNP    Additional Case Management Notes: Presented to [facility] ED with new confusion. Daughter reported patient was normal in the morning then through the day became more confused. Pt had chemo 5 days prior. Found in ED to have SBP 70's. Received 2.5L fld and started on levophed drip. Transferred to [facility].     Sats 95% on 2L O2. Afib 80's. Afebrile. Oriented to person and place. Follows commands. Palliative Care consulted. PT/OT. DNRCCA. +MRSA nares. Telemetry, bill morales. Levo @ 8 mcg/min, IV rocephin, IV solucortef 100 mg Q8H, synthroid, protonix, xarelto, IV vancomycin. Na+ 134, BUN 29, Creat 1.9, LA 3, trop 258, alb 3.2, ast 43, total bili 1.3, total pro 5.7, wbc 12.3, hgb 9, plt 65, INR 2.26. Urine w/+nitrates and small luekocytes - sent for culture. Blood cultures sent.     Procedures: none    Imagin/17 CXR: 1. Poor inflation the lungs. Mild cardiomegaly. MediPort left side, catheter tip in superior vena cava. Right jugular central line, tip at cavoatrial junction.  2. No effusion seen. Mild interstitial pneumonia/edema involving both lungs relatively diffusely.    Patient Goals/Plan/Treatment Preferences: From home alone. Lives in her own small house on daughter Samantha's property, right next to daughters home. Uses wheelchair mostly and transfers self in/out of wheelchair. Also has walker. Uses shower chair.

## 2024-04-18 ENCOUNTER — APPOINTMENT (OUTPATIENT)
Age: 89
DRG: 871 | End: 2024-04-18
Attending: NUCLEAR MEDICINE
Payer: MEDICARE

## 2024-04-18 PROBLEM — R65.21 SEPTIC SHOCK (HCC): Status: ACTIVE | Noted: 2024-04-17

## 2024-04-18 LAB
ANION GAP SERPL CALC-SCNC: 14 MEQ/L (ref 8–16)
ANISOCYTOSIS BLD QL SMEAR: PRESENT
BASOPHILS ABSOLUTE: 0 THOU/MM3 (ref 0–0.1)
BASOPHILS NFR BLD AUTO: 0.4 %
BUN SERPL-MCNC: 30 MG/DL (ref 7–22)
CALCIUM SERPL-MCNC: 8.7 MG/DL (ref 8.5–10.5)
CHLORIDE SERPL-SCNC: 109 MEQ/L (ref 98–111)
CO2 SERPL-SCNC: 18 MEQ/L (ref 23–33)
CREAT SERPL-MCNC: 1.3 MG/DL (ref 0.4–1.2)
DEPRECATED RDW RBC AUTO: 66.4 FL (ref 35–45)
ECHO AO ASC DIAM: 3.6 CM
ECHO AO ASCENDING AORTA INDEX: 1.84 CM/M2
ECHO AO SINUS VALSALVA DIAM: 3.2 CM
ECHO AO SINUS VALSALVA INDEX: 1.63 CM/M2
ECHO AO ST JNCT DIAM: 2.5 CM
ECHO AR MAX VEL PISA: 3.7 M/S
ECHO AV CUSP MM: 1.9 CM
ECHO AV MEAN GRADIENT: 3 MMHG
ECHO AV MEAN VELOCITY: 0.8 M/S
ECHO AV PEAK GRADIENT: 6 MMHG
ECHO AV PEAK VELOCITY: 1.3 M/S
ECHO AV REGURGITANT PHT: 630 MS
ECHO AV VELOCITY RATIO: 0.54
ECHO AV VTI: 19.5 CM
ECHO BSA: 2.05 M2
ECHO EST RA PRESSURE: 5 MMHG
ECHO LA AREA 4C: 30.4 CM2
ECHO LA DIAMETER INDEX: 2.04 CM/M2
ECHO LA DIAMETER: 4 CM
ECHO LA MAJOR AXIS: 7.2 CM
ECHO LA VOL MOD A4C: 100 ML (ref 22–52)
ECHO LA VOLUME INDEX MOD A4C: 51 ML/M2 (ref 16–34)
ECHO LV FRACTIONAL SHORTENING: 27 % (ref 28–44)
ECHO LV INTERNAL DIMENSION DIASTOLE INDEX: 2.09 CM/M2
ECHO LV INTERNAL DIMENSION DIASTOLIC: 4.1 CM (ref 3.9–5.3)
ECHO LV INTERNAL DIMENSION SYSTOLIC INDEX: 1.53 CM/M2
ECHO LV INTERNAL DIMENSION SYSTOLIC: 3 CM
ECHO LV ISOVOLUMETRIC RELAXATION TIME (IVRT): 63 MS
ECHO LV IVSD: 1.3 CM (ref 0.6–0.9)
ECHO LV MASS 2D: 182.5 G (ref 67–162)
ECHO LV MASS INDEX 2D: 93.1 G/M2 (ref 43–95)
ECHO LV POSTERIOR WALL DIASTOLIC: 1.2 CM (ref 0.6–0.9)
ECHO LV RELATIVE WALL THICKNESS RATIO: 0.59
ECHO LVOT AV VTI INDEX: 0.64
ECHO LVOT MEAN GRADIENT: 1 MMHG
ECHO LVOT PEAK GRADIENT: 2 MMHG
ECHO LVOT PEAK VELOCITY: 0.7 M/S
ECHO LVOT VTI: 12.5 CM
ECHO MV E DECELERATION TIME (DT): 168 MS
ECHO MV E VELOCITY: 1.14 M/S
ECHO MV REGURGITANT PEAK GRADIENT: 88 MMHG
ECHO MV REGURGITANT PEAK VELOCITY: 4.7 M/S
ECHO PULMONARY ARTERY END DIASTOLIC PRESSURE: 6 MMHG
ECHO PV MAX VELOCITY: 0.7 M/S
ECHO PV PEAK GRADIENT: 2 MMHG
ECHO PV REGURGITANT MAX VELOCITY: 1.2 M/S
ECHO RIGHT VENTRICULAR SYSTOLIC PRESSURE (RVSP): 34 MMHG
ECHO RV INTERNAL DIMENSION: 2.6 CM
ECHO RV TAPSE: 1.6 CM (ref 1.7–?)
ECHO TV E WAVE: 0.6 M/S
ECHO TV REGURGITANT MAX VELOCITY: 2.68 M/S
ECHO TV REGURGITANT PEAK GRADIENT: 29 MMHG
EKG Q-T INTERVAL: 434 MS
EKG QRS DURATION: 84 MS
EKG QTC CALCULATION (BAZETT): 528 MS
EKG R AXIS: -29 DEGREES
EKG T AXIS: 161 DEGREES
EKG VENTRICULAR RATE: 89 BPM
EOSINOPHIL NFR BLD AUTO: 0 %
EOSINOPHILS ABSOLUTE: 0 THOU/MM3 (ref 0–0.4)
ERYTHROCYTE [DISTWIDTH] IN BLOOD BY AUTOMATED COUNT: 18.1 % (ref 11.5–14.5)
GFR SERPL CREATININE-BSD FRML MDRD: 40 ML/MIN/1.73M2
GLUCOSE SERPL-MCNC: 135 MG/DL (ref 70–108)
HCT VFR BLD AUTO: 28.7 % (ref 37–47)
HGB BLD-MCNC: 9 GM/DL (ref 12–16)
IMM GRANULOCYTES # BLD AUTO: 0.26 THOU/MM3 (ref 0–0.07)
IMM GRANULOCYTES NFR BLD AUTO: 2.6 %
L PNEUMO1 AG UR QL IA.RAPID: NEGATIVE
LYMPHOCYTES ABSOLUTE: 0.5 THOU/MM3 (ref 1–4.8)
LYMPHOCYTES NFR BLD AUTO: 4.8 %
MAGNESIUM SERPL-MCNC: 2 MG/DL (ref 1.6–2.4)
MCH RBC QN AUTO: 31.7 PG (ref 26–33)
MCHC RBC AUTO-ENTMCNC: 31.4 GM/DL (ref 32.2–35.5)
MCV RBC AUTO: 101.1 FL (ref 81–99)
MONOCYTES ABSOLUTE: 0.5 THOU/MM3 (ref 0.4–1.3)
MONOCYTES NFR BLD AUTO: 4.9 %
NEUTROPHILS NFR BLD AUTO: 87.3 %
NRBC BLD AUTO-RTO: 0 /100 WBC
PLATELET # BLD AUTO: 71 THOU/MM3 (ref 130–400)
PMV BLD AUTO: 13.6 FL (ref 9.4–12.4)
POTASSIUM SERPL-SCNC: 4.8 MEQ/L (ref 3.5–5.2)
PROCALCITONIN SERPL IA-MCNC: 43.5 NG/ML (ref 0.01–0.09)
RBC # BLD AUTO: 2.84 MILL/MM3 (ref 4.2–5.4)
SEGMENTED NEUTROPHILS ABSOLUTE COUNT: 8.6 THOU/MM3 (ref 1.8–7.7)
SODIUM SERPL-SCNC: 141 MEQ/L (ref 135–145)
STREP PNEUMO AG, UR: NEGATIVE
WBC # BLD AUTO: 9.8 THOU/MM3 (ref 4.8–10.8)

## 2024-04-18 PROCEDURE — 2580000003 HC RX 258: Performed by: NURSE PRACTITIONER

## 2024-04-18 PROCEDURE — 1200000003 HC TELEMETRY R&B

## 2024-04-18 PROCEDURE — 97530 THERAPEUTIC ACTIVITIES: CPT

## 2024-04-18 PROCEDURE — 80048 BASIC METABOLIC PNL TOTAL CA: CPT

## 2024-04-18 PROCEDURE — 6370000000 HC RX 637 (ALT 250 FOR IP)

## 2024-04-18 PROCEDURE — 36415 COLL VENOUS BLD VENIPUNCTURE: CPT

## 2024-04-18 PROCEDURE — 6360000002 HC RX W HCPCS: Performed by: NURSE PRACTITIONER

## 2024-04-18 PROCEDURE — 84145 PROCALCITONIN (PCT): CPT

## 2024-04-18 PROCEDURE — 93306 TTE W/DOPPLER COMPLETE: CPT | Performed by: NUCLEAR MEDICINE

## 2024-04-18 PROCEDURE — 99223 1ST HOSP IP/OBS HIGH 75: CPT | Performed by: NUCLEAR MEDICINE

## 2024-04-18 PROCEDURE — 87081 CULTURE SCREEN ONLY: CPT

## 2024-04-18 PROCEDURE — 87899 AGENT NOS ASSAY W/OPTIC: CPT

## 2024-04-18 PROCEDURE — 83735 ASSAY OF MAGNESIUM: CPT

## 2024-04-18 PROCEDURE — 85025 COMPLETE CBC W/AUTO DIFF WBC: CPT

## 2024-04-18 PROCEDURE — 93306 TTE W/DOPPLER COMPLETE: CPT

## 2024-04-18 PROCEDURE — 97163 PT EVAL HIGH COMPLEX 45 MIN: CPT

## 2024-04-18 PROCEDURE — 99232 SBSQ HOSP IP/OBS MODERATE 35: CPT

## 2024-04-18 PROCEDURE — 6370000000 HC RX 637 (ALT 250 FOR IP): Performed by: NURSE PRACTITIONER

## 2024-04-18 PROCEDURE — 99231 SBSQ HOSP IP/OBS SF/LOW 25: CPT

## 2024-04-18 PROCEDURE — 99233 SBSQ HOSP IP/OBS HIGH 50: CPT | Performed by: INTERNAL MEDICINE

## 2024-04-18 PROCEDURE — 87449 NOS EACH ORGANISM AG IA: CPT

## 2024-04-18 PROCEDURE — 6360000002 HC RX W HCPCS

## 2024-04-18 PROCEDURE — 97166 OT EVAL MOD COMPLEX 45 MIN: CPT

## 2024-04-18 RX ORDER — LANOLIN ALCOHOL/MO/W.PET/CERES
1000 CREAM (GRAM) TOPICAL EVERY OTHER DAY
Status: DISCONTINUED | OUTPATIENT
Start: 2024-04-18 | End: 2024-04-28 | Stop reason: HOSPADM

## 2024-04-18 RX ORDER — MAGNESIUM SULFATE 1 G/100ML
1000 INJECTION INTRAVENOUS ONCE
Status: DISCONTINUED | OUTPATIENT
Start: 2024-04-18 | End: 2024-04-18

## 2024-04-18 RX ORDER — AMIODARONE HYDROCHLORIDE 200 MG/1
100 TABLET ORAL DAILY
Status: DISCONTINUED | OUTPATIENT
Start: 2024-04-18 | End: 2024-04-28 | Stop reason: HOSPADM

## 2024-04-18 RX ORDER — MELATONIN/THEANINE 3 MG-50 MG
2 TABLET,DISINTEGRATING ORAL NIGHTLY
COMMUNITY

## 2024-04-18 RX ORDER — FOLIC ACID 1 MG/1
1 TABLET ORAL DAILY
Status: DISCONTINUED | OUTPATIENT
Start: 2024-04-18 | End: 2024-04-28 | Stop reason: HOSPADM

## 2024-04-18 RX ORDER — ONDANSETRON 2 MG/ML
4 INJECTION INTRAMUSCULAR; INTRAVENOUS EVERY 6 HOURS PRN
Status: DISCONTINUED | OUTPATIENT
Start: 2024-04-18 | End: 2024-04-18

## 2024-04-18 RX ORDER — HYDROXYZINE HYDROCHLORIDE 25 MG/1
25 TABLET, FILM COATED ORAL 3 TIMES DAILY PRN
Status: DISCONTINUED | OUTPATIENT
Start: 2024-04-18 | End: 2024-04-28 | Stop reason: HOSPADM

## 2024-04-18 RX ORDER — METOPROLOL SUCCINATE 25 MG/1
25 TABLET, EXTENDED RELEASE ORAL DAILY
Status: DISCONTINUED | OUTPATIENT
Start: 2024-04-18 | End: 2024-04-28 | Stop reason: HOSPADM

## 2024-04-18 RX ORDER — ONDANSETRON 4 MG/1
4 TABLET, ORALLY DISINTEGRATING ORAL EVERY 8 HOURS PRN
Status: DISCONTINUED | OUTPATIENT
Start: 2024-04-18 | End: 2024-04-18

## 2024-04-18 RX ORDER — POLYETHYLENE GLYCOL 3350 17 G/17G
17 POWDER, FOR SOLUTION ORAL DAILY PRN
Status: DISCONTINUED | OUTPATIENT
Start: 2024-04-18 | End: 2024-04-28 | Stop reason: HOSPADM

## 2024-04-18 RX ORDER — FUROSEMIDE 40 MG/1
40 TABLET ORAL DAILY
Status: DISCONTINUED | OUTPATIENT
Start: 2024-04-19 | End: 2024-04-28 | Stop reason: HOSPADM

## 2024-04-18 RX ADMIN — PANTOPRAZOLE SODIUM 40 MG: 40 TABLET, DELAYED RELEASE ORAL at 09:04

## 2024-04-18 RX ADMIN — AMIODARONE HYDROCHLORIDE 100 MG: 200 TABLET ORAL at 13:31

## 2024-04-18 RX ADMIN — SODIUM CHLORIDE, PRESERVATIVE FREE 10 ML: 5 INJECTION INTRAVENOUS at 19:55

## 2024-04-18 RX ADMIN — HYDROCORTISONE SODIUM SUCCINATE 50 MG: 100 INJECTION, POWDER, FOR SOLUTION INTRAMUSCULAR; INTRAVENOUS at 19:55

## 2024-04-18 RX ADMIN — SODIUM CHLORIDE, PRESERVATIVE FREE 10 ML: 5 INJECTION INTRAVENOUS at 09:12

## 2024-04-18 RX ADMIN — ACETAMINOPHEN 650 MG: 325 TABLET ORAL at 09:04

## 2024-04-18 RX ADMIN — SODIUM CHLORIDE, PRESERVATIVE FREE 10 ML: 5 INJECTION INTRAVENOUS at 18:15

## 2024-04-18 RX ADMIN — LEVOTHYROXINE SODIUM 88 MCG: 0.09 TABLET ORAL at 09:03

## 2024-04-18 RX ADMIN — Medication 1000 MCG: at 13:31

## 2024-04-18 RX ADMIN — CEFTRIAXONE SODIUM 2000 MG: 2 INJECTION, POWDER, FOR SOLUTION INTRAMUSCULAR; INTRAVENOUS at 18:20

## 2024-04-18 RX ADMIN — HYDROXYZINE HYDROCHLORIDE 25 MG: 25 TABLET, FILM COATED ORAL at 22:07

## 2024-04-18 RX ADMIN — HYDROCORTISONE SODIUM SUCCINATE 100 MG: 100 INJECTION, POWDER, FOR SOLUTION INTRAMUSCULAR; INTRAVENOUS at 04:35

## 2024-04-18 RX ADMIN — HYDROCORTISONE SODIUM SUCCINATE 100 MG: 100 INJECTION, POWDER, FOR SOLUTION INTRAMUSCULAR; INTRAVENOUS at 13:33

## 2024-04-18 RX ADMIN — FOLIC ACID 1 MG: 1 TABLET ORAL at 13:32

## 2024-04-18 RX ADMIN — RIVAROXABAN 15 MG: 15 TABLET, FILM COATED ORAL at 09:03

## 2024-04-18 ASSESSMENT — PAIN SCALES - GENERAL
PAINLEVEL_OUTOF10: 0
PAINLEVEL_OUTOF10: 5

## 2024-04-18 ASSESSMENT — PAIN DESCRIPTION - DESCRIPTORS: DESCRIPTORS: ACHING

## 2024-04-18 ASSESSMENT — PAIN DESCRIPTION - LOCATION: LOCATION: HEAD

## 2024-04-18 ASSESSMENT — PAIN DESCRIPTION - ORIENTATION: ORIENTATION: ANTERIOR

## 2024-04-18 NOTE — PALLIATIVE CARE
Follow Up / Progress Note        Patient:   Sherice Connolly  YOB: 1935  Age:  89 y.o.  Room:  [facility]07/007-  MRN:  [id number]         Family/Patient Discussion:  Patient up to chair.  Patient with confusion.  Patient's daughter, Yolanda at the bedside.  No needs noted at this time.  Emotional support provided.      Plan/Follow-Up:  Palliative care will continue to follow and staff may call prn if needs arise.         Electronically signed by Marina Hernandez RN on 4/18/2024 at 9:05 AM             Palliative Care Office: [phone]   
continued chemotherapy aligns with her wishes.  Discussed current code status level and that it is very aggressive and all measures to prolong life will be offered but that resuscitative measures will not take place if heart/breathing should stop.  Discussed DNR CC code status and when it would be appropriate.    Much emotional support provided.        Plan/Follow-Up:-    Palliative care contact information provided to Yolanda and she is encouraged to call if she or the patient have any further questions or concerns for palliative care.  Staff may call prn if needs arise.           Electronically signed by Marina Hernandez RN on 4/17/2024 at 1:13 PM           Palliative Care Office: [phone]

## 2024-04-18 NOTE — CONSULTS
04/17/2024 05:45 AM    RBCUA 15-25 04/17/2024 05:45 AM    YEAST NONE SEEN 04/17/2024 05:45 AM    BACTERIA MANY 04/17/2024 05:45 AM    SPECGRAV 1.019 04/17/2024 05:45 AM    LEUKOCYTESUR MODERATE 04/17/2024 05:45 AM    UROBILINOGEN 1.0 04/17/2024 05:45 AM    BILIRUBINUR NEGATIVE 04/17/2024 05:45 AM    BLOODU MODERATE 04/17/2024 05:45 AM         Physical Exam:  Vitals:    04/18/24 0730   BP:    Pulse:    Resp:    Temp: 97.1 °F (36.2 °C)   SpO2:       Intake/Output Summary (Last 24 hours) at 4/18/2024 0859  Last data filed at 4/18/2024 0600  Gross per 24 hour   Intake 1718.57 ml   Output 1250 ml   Net 468.57 ml      General:  No acute distress  Neck: Supple, no JVD, no carotid bruits  Heart: irregular rate and rhythm; no murmurs, rubs, or gallops  Lungs: diminished breath sounds bilaterally with no wheezes, crackles, or rhonchi  Abdomen: positive bowel sounds, soft, non-tender, non-distended, no bruits, no masses  Extremities:no clubbing, cyanosis, trace pitting edema bilaterally  : Kessler catheter in place  Neurologic: alert and oriented x 3, cranial nerves 2-12 grossly intact, motor and sensory intact, moving all extremities  Skin: No rashes  Psych: AO x 3, no depression/james, no pressured speech, normal affect  Lymph: No obvious LAD      Assessment:  Septic Shock Present on Admission  LESLIE on CKD 3 (Baseline 0.95-1.3)  Paroxsymal Atrial Fibrillation, on DOAC outpatient  HTN  CAD? 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 EKG showing

## 2024-04-19 PROBLEM — A41.50 SEPSIS DUE TO GRAM NEGATIVE BACTERIA (HCC): Status: ACTIVE | Noted: 2024-04-19

## 2024-04-19 LAB
ANION GAP SERPL CALC-SCNC: 14 MEQ/L (ref 8–16)
ANISOCYTOSIS BLD QL SMEAR: PRESENT
BACTERIA UR CULT: ABNORMAL
BASOPHILS ABSOLUTE: 0 THOU/MM3 (ref 0–0.1)
BASOPHILS NFR BLD AUTO: 0.1 %
BUN SERPL-MCNC: 30 MG/DL (ref 7–22)
CALCIUM SERPL-MCNC: 8.8 MG/DL (ref 8.5–10.5)
CHLORIDE SERPL-SCNC: 109 MEQ/L (ref 98–111)
CO2 SERPL-SCNC: 16 MEQ/L (ref 23–33)
CREAT SERPL-MCNC: 1.1 MG/DL (ref 0.4–1.2)
DEPRECATED RDW RBC AUTO: 74.5 FL (ref 35–45)
EOSINOPHIL NFR BLD AUTO: 0 %
EOSINOPHILS ABSOLUTE: 0 THOU/MM3 (ref 0–0.4)
ERYTHROCYTE [DISTWIDTH] IN BLOOD BY AUTOMATED COUNT: 18.5 % (ref 11.5–14.5)
GFR SERPL CREATININE-BSD FRML MDRD: 49 ML/MIN/1.73M2
GLUCOSE BLD STRIP.AUTO-MCNC: 123 MG/DL (ref 70–108)
GLUCOSE BLD STRIP.AUTO-MCNC: 147 MG/DL (ref 70–108)
GLUCOSE BLD STRIP.AUTO-MCNC: 155 MG/DL (ref 70–108)
GLUCOSE BLD STRIP.AUTO-MCNC: 210 MG/DL (ref 70–108)
GLUCOSE SERPL-MCNC: 121 MG/DL (ref 70–108)
HCT VFR BLD AUTO: 32.8 % (ref 37–47)
HGB BLD-MCNC: 9.6 GM/DL (ref 12–16)
IMM GRANULOCYTES # BLD AUTO: 0.13 THOU/MM3 (ref 0–0.07)
IMM GRANULOCYTES NFR BLD AUTO: 1.7 %
LYMPHOCYTES ABSOLUTE: 0.7 THOU/MM3 (ref 1–4.8)
LYMPHOCYTES NFR BLD AUTO: 8.3 %
MACROCYTES BLD QL SMEAR: PRESENT
MCH RBC QN AUTO: 32.3 PG (ref 26–33)
MCHC RBC AUTO-ENTMCNC: 29.3 GM/DL (ref 32.2–35.5)
MCV RBC AUTO: 110.4 FL (ref 81–99)
MONOCYTES ABSOLUTE: 0.5 THOU/MM3 (ref 0.4–1.3)
MONOCYTES NFR BLD AUTO: 6.5 %
NEUTROPHILS NFR BLD AUTO: 83.4 %
NRBC BLD AUTO-RTO: 3 /100 WBC
ORGANISM: ABNORMAL
PLATELET # BLD AUTO: 85 THOU/MM3 (ref 130–400)
PMV BLD AUTO: 13 FL (ref 9.4–12.4)
POTASSIUM SERPL-SCNC: 4.6 MEQ/L (ref 3.5–5.2)
RBC # BLD AUTO: 2.97 MILL/MM3 (ref 4.2–5.4)
SEGMENTED NEUTROPHILS ABSOLUTE COUNT: 6.6 THOU/MM3 (ref 1.8–7.7)
SODIUM SERPL-SCNC: 139 MEQ/L (ref 135–145)
WBC # BLD AUTO: 7.9 THOU/MM3 (ref 4.8–10.8)

## 2024-04-19 PROCEDURE — 6360000002 HC RX W HCPCS: Performed by: NURSE PRACTITIONER

## 2024-04-19 PROCEDURE — 97110 THERAPEUTIC EXERCISES: CPT

## 2024-04-19 PROCEDURE — 99232 SBSQ HOSP IP/OBS MODERATE 35: CPT | Performed by: STUDENT IN AN ORGANIZED HEALTH CARE EDUCATION/TRAINING PROGRAM

## 2024-04-19 PROCEDURE — 85025 COMPLETE CBC W/AUTO DIFF WBC: CPT

## 2024-04-19 PROCEDURE — 6360000002 HC RX W HCPCS

## 2024-04-19 PROCEDURE — 99232 SBSQ HOSP IP/OBS MODERATE 35: CPT

## 2024-04-19 PROCEDURE — 36415 COLL VENOUS BLD VENIPUNCTURE: CPT

## 2024-04-19 PROCEDURE — 6370000000 HC RX 637 (ALT 250 FOR IP)

## 2024-04-19 PROCEDURE — 80048 BASIC METABOLIC PNL TOTAL CA: CPT

## 2024-04-19 PROCEDURE — 97530 THERAPEUTIC ACTIVITIES: CPT

## 2024-04-19 PROCEDURE — 82948 REAGENT STRIP/BLOOD GLUCOSE: CPT

## 2024-04-19 PROCEDURE — 6370000000 HC RX 637 (ALT 250 FOR IP): Performed by: NURSE PRACTITIONER

## 2024-04-19 PROCEDURE — 1200000003 HC TELEMETRY R&B

## 2024-04-19 PROCEDURE — 2580000003 HC RX 258: Performed by: NURSE PRACTITIONER

## 2024-04-19 RX ADMIN — AMIODARONE HYDROCHLORIDE 100 MG: 200 TABLET ORAL at 10:26

## 2024-04-19 RX ADMIN — SODIUM CHLORIDE, PRESERVATIVE FREE 10 ML: 5 INJECTION INTRAVENOUS at 20:58

## 2024-04-19 RX ADMIN — METOPROLOL SUCCINATE 25 MG: 25 TABLET, FILM COATED, EXTENDED RELEASE ORAL at 10:26

## 2024-04-19 RX ADMIN — LEVOTHYROXINE SODIUM 88 MCG: 0.09 TABLET ORAL at 10:25

## 2024-04-19 RX ADMIN — FOLIC ACID 1 MG: 1 TABLET ORAL at 10:26

## 2024-04-19 RX ADMIN — FUROSEMIDE 40 MG: 40 TABLET ORAL at 10:26

## 2024-04-19 RX ADMIN — RIVAROXABAN 20 MG: 20 TABLET, FILM COATED ORAL at 14:33

## 2024-04-19 RX ADMIN — PANTOPRAZOLE SODIUM 40 MG: 40 TABLET, DELAYED RELEASE ORAL at 10:26

## 2024-04-19 RX ADMIN — HYDROCORTISONE SODIUM SUCCINATE 50 MG: 100 INJECTION, POWDER, FOR SOLUTION INTRAMUSCULAR; INTRAVENOUS at 14:31

## 2024-04-19 RX ADMIN — SODIUM CHLORIDE, PRESERVATIVE FREE 10 ML: 5 INJECTION INTRAVENOUS at 14:39

## 2024-04-19 RX ADMIN — CEFTRIAXONE SODIUM 2000 MG: 2 INJECTION, POWDER, FOR SOLUTION INTRAMUSCULAR; INTRAVENOUS at 17:12

## 2024-04-19 RX ADMIN — HYDROCORTISONE SODIUM SUCCINATE 50 MG: 100 INJECTION, POWDER, FOR SOLUTION INTRAMUSCULAR; INTRAVENOUS at 05:35

## 2024-04-19 NOTE — CARE COORDINATION
4/19/24, 10:00 AM EDT    DISCHARGE ON GOING EVALUATION    Sherice KUMAR [facility] day: 2  Location: -17/017-A Reason for admit: Sepsis (HCC) [A41.9]     Procedures: na    Imaging since last note: 4/18 ECHO: Moderately reduced left ventricular systolic function with a visually estimated EF of 30 - 35%. Left ventricle size is normal. Normal wall thickness. Mild global hypokinesis present. Severe hypokinesis of the following segments: apical anterior. Normal diastolic function.     Barriers to Discharge: Hospitalist, Cardiology, and therapy. Urine culture growing e coli. IV Rocephin and IV solumedrol.     PCP: Yi Martin DO  Readmission Risk Score: 17.4%    Patient Goals/Plan/Treatment Preferences: From home alone, on her daughter's property. SW consulted for new .

## 2024-04-20 LAB
ACB COMPLEX DNA BLD POS QL NAA+NON-PROBE: NOT DETECTED
B FRAGILIS DNA BLD POS QL NAA+NON-PROBE: NOT DETECTED
BLACTX-M ISLT/SPM QL: NOT DETECTED
BLAIMP ISLT/SPM QL: NOT DETECTED
BLAKPC ISLT/SPM QL: NOT DETECTED
BLAOXA-48-LIKE ISLT/SPM QL: NOT DETECTED
BLAVIM ISLT/SPM QL: NOT DETECTED
BOTTLE TYPE: ABNORMAL
C ALBICANS DNA BLD POS QL NAA+NON-PROBE: NOT DETECTED
C AURIS DNA BLD POS QL NAA+NON-PROBE: NOT DETECTED
C GATTII+NEOFOR DNA BLD POS QL NAA+N-PRB: NOT DETECTED
C GLABRATA DNA BLD POS QL NAA+NON-PROBE: NOT DETECTED
C KRUSEI DNA BLD POS QL NAA+NON-PROBE: NOT DETECTED
C PARAP DNA BLD POS QL NAA+NON-PROBE: NOT DETECTED
C TROPICLS DNA BLD POS QL NAA+NON-PROBE: NOT DETECTED
COAG NEG STAPH DNA BLD QL NAA+PROBE: NOT DETECTED
COLISTIN RES MCR-1 ISLT/SPM QL: NOT DETECTED
E CLOAC COMP DNA BLD POS NAA+NON-PROBE: NOT DETECTED
E COLI DNA BLD POS QL NAA+NON-PROBE: DETECTED
E FAECALIS DNA BLD POS QL NAA+NON-PROBE: NOT DETECTED
E FAECIUM DNA BLD POS QL NAA+NON-PROBE: NOT DETECTED
ENTEROBACTERALES DNA BLD POS NAA+N-PRB: DETECTED
GLUCOSE BLD STRIP.AUTO-MCNC: 138 MG/DL (ref 70–108)
GLUCOSE BLD STRIP.AUTO-MCNC: 145 MG/DL (ref 70–108)
GLUCOSE BLD STRIP.AUTO-MCNC: 89 MG/DL (ref 70–108)
GP B STREP DNA SPEC QL NAA+PROBE: NOT DETECTED
GP B STREP DNA SPEC QL NAA+PROBE: NOT DETECTED
HAEM INFLU DNA BLD POS QL NAA+NON-PROBE: NOT DETECTED
K OXYTOCA DNA BLD POS QL NAA+NON-PROBE: NOT DETECTED
K OXYTOCA DNA BLD POS QL NAA+NON-PROBE: NOT DETECTED
KLEBSIELLA SP DNA BLD POS QL NAA+NON-PRB: NOT DETECTED
L MONOCYTOG DNA BLD POS QL NAA+NON-PROBE: NOT DETECTED
MECA ISLT/SPM QL: ABNORMAL
MECA+MECC+MREJ ISLT/SPM QL: ABNORMAL
N MEN DNA BLD POS QL NAA+NON-PROBE: NOT DETECTED
NDM: NOT DETECTED
P AERUGINOSA DNA BLD POS NAA+NON-PROBE: NOT DETECTED
PROTEUS SPP: NOT DETECTED
S AUREUS DNA BLD POS QL NAA+NON-PROBE: NOT DETECTED
S EPIDERMIDIS DNA BLD POS QL NAA+NON-PRB: NOT DETECTED
S LUGDUNENSIS DNA BLD POS QL NAA+NON-PRB: NOT DETECTED
S MALTOPHILIA DNA BLD POS QL NAA+NON-PRB: NOT DETECTED
S MARCESCENS DNA BLD POS NAA+NON-PROBE: NOT DETECTED
S PYO DNA THROAT QL NAA+PROBE: NOT DETECTED
SALMONELLA DNA BLD POS QL NAA+NON-PROBE: NOT DETECTED
SOURCE OF BLOOD CULTURE: ABNORMAL
STREPTOCOCCUS DNA BLD QL NAA+PROBE: NOT DETECTED
VANA+VANB ISLT/SPM QL: ABNORMAL
VRE SPEC QL CULT: NORMAL

## 2024-04-20 PROCEDURE — 6370000000 HC RX 637 (ALT 250 FOR IP)

## 2024-04-20 PROCEDURE — 6360000002 HC RX W HCPCS

## 2024-04-20 PROCEDURE — 2580000003 HC RX 258: Performed by: NURSE PRACTITIONER

## 2024-04-20 PROCEDURE — 99232 SBSQ HOSP IP/OBS MODERATE 35: CPT

## 2024-04-20 PROCEDURE — 6370000000 HC RX 637 (ALT 250 FOR IP): Performed by: NURSE PRACTITIONER

## 2024-04-20 PROCEDURE — 99231 SBSQ HOSP IP/OBS SF/LOW 25: CPT | Performed by: STUDENT IN AN ORGANIZED HEALTH CARE EDUCATION/TRAINING PROGRAM

## 2024-04-20 PROCEDURE — 82948 REAGENT STRIP/BLOOD GLUCOSE: CPT

## 2024-04-20 PROCEDURE — 2580000003 HC RX 258

## 2024-04-20 PROCEDURE — 87801 DETECT AGNT MULT DNA AMPLI: CPT

## 2024-04-20 PROCEDURE — 1200000000 HC SEMI PRIVATE

## 2024-04-20 RX ADMIN — LEVOTHYROXINE SODIUM 88 MCG: 0.09 TABLET ORAL at 08:15

## 2024-04-20 RX ADMIN — FUROSEMIDE 40 MG: 40 TABLET ORAL at 08:15

## 2024-04-20 RX ADMIN — FOLIC ACID 1 MG: 1 TABLET ORAL at 08:15

## 2024-04-20 RX ADMIN — METOPROLOL SUCCINATE 25 MG: 25 TABLET, FILM COATED, EXTENDED RELEASE ORAL at 08:15

## 2024-04-20 RX ADMIN — AMIODARONE HYDROCHLORIDE 100 MG: 200 TABLET ORAL at 08:15

## 2024-04-20 RX ADMIN — SODIUM CHLORIDE, PRESERVATIVE FREE 10 ML: 5 INJECTION INTRAVENOUS at 08:15

## 2024-04-20 RX ADMIN — HYDROXYZINE HYDROCHLORIDE 25 MG: 25 TABLET, FILM COATED ORAL at 06:01

## 2024-04-20 RX ADMIN — SODIUM CHLORIDE, PRESERVATIVE FREE 10 ML: 5 INJECTION INTRAVENOUS at 20:56

## 2024-04-20 RX ADMIN — CEFTRIAXONE SODIUM 2000 MG: 2 INJECTION, POWDER, FOR SOLUTION INTRAMUSCULAR; INTRAVENOUS at 17:44

## 2024-04-20 RX ADMIN — Medication 1000 MCG: at 08:15

## 2024-04-20 RX ADMIN — HYDROXYZINE HYDROCHLORIDE 25 MG: 25 TABLET, FILM COATED ORAL at 23:32

## 2024-04-20 RX ADMIN — PANTOPRAZOLE SODIUM 40 MG: 40 TABLET, DELAYED RELEASE ORAL at 08:15

## 2024-04-20 NOTE — DISCHARGE INSTRUCTIONS
Can f/u with cardiology if patient desires. Not absolutely necessary give code status and not wanting further workup.

## 2024-04-21 LAB
25(OH)D3 SERPL-MCNC: 36 NG/ML (ref 30–100)
CREAT UR-MCNC: 81 MG/DL
GLUCOSE BLD STRIP.AUTO-MCNC: 106 MG/DL (ref 70–108)
GLUCOSE BLD STRIP.AUTO-MCNC: 111 MG/DL (ref 70–108)
GLUCOSE BLD STRIP.AUTO-MCNC: 155 MG/DL (ref 70–108)
GLUCOSE BLD STRIP.AUTO-MCNC: 99 MG/DL (ref 70–108)
MICROALBUMIN UR-MCNC: < 1.2 MG/DL
MICROALBUMIN/CREAT RATIO PNL UR: 15 MG/G (ref 0–30)
PHOSPHATE SERPL-MCNC: 3 MG/DL (ref 2.4–4.7)

## 2024-04-21 PROCEDURE — 6370000000 HC RX 637 (ALT 250 FOR IP)

## 2024-04-21 PROCEDURE — 84100 ASSAY OF PHOSPHORUS: CPT

## 2024-04-21 PROCEDURE — 6370000000 HC RX 637 (ALT 250 FOR IP): Performed by: NURSE PRACTITIONER

## 2024-04-21 PROCEDURE — 82948 REAGENT STRIP/BLOOD GLUCOSE: CPT

## 2024-04-21 PROCEDURE — 6360000002 HC RX W HCPCS

## 2024-04-21 PROCEDURE — 82043 UR ALBUMIN QUANTITATIVE: CPT

## 2024-04-21 PROCEDURE — 82306 VITAMIN D 25 HYDROXY: CPT

## 2024-04-21 PROCEDURE — 2580000003 HC RX 258

## 2024-04-21 PROCEDURE — 99232 SBSQ HOSP IP/OBS MODERATE 35: CPT

## 2024-04-21 PROCEDURE — 1200000000 HC SEMI PRIVATE

## 2024-04-21 PROCEDURE — 2500000003 HC RX 250 WO HCPCS

## 2024-04-21 PROCEDURE — 2580000003 HC RX 258: Performed by: NURSE PRACTITIONER

## 2024-04-21 PROCEDURE — 36415 COLL VENOUS BLD VENIPUNCTURE: CPT

## 2024-04-21 RX ORDER — LANOLIN ALCOHOL/MO/W.PET/CERES
3 CREAM (GRAM) TOPICAL NIGHTLY
Status: DISCONTINUED | OUTPATIENT
Start: 2024-04-21 | End: 2024-04-28 | Stop reason: HOSPADM

## 2024-04-21 RX ORDER — HYDROXYZINE PAMOATE 25 MG/1
25 CAPSULE ORAL ONCE
Status: COMPLETED | OUTPATIENT
Start: 2024-04-21 | End: 2024-04-21

## 2024-04-21 RX ADMIN — LEVOTHYROXINE SODIUM 88 MCG: 0.09 TABLET ORAL at 08:02

## 2024-04-21 RX ADMIN — HYDROXYZINE PAMOATE 25 MG: 25 CAPSULE ORAL at 01:42

## 2024-04-21 RX ADMIN — MICONAZOLE NITRATE: 2 POWDER TOPICAL at 08:02

## 2024-04-21 RX ADMIN — METOPROLOL SUCCINATE 25 MG: 25 TABLET, FILM COATED, EXTENDED RELEASE ORAL at 08:02

## 2024-04-21 RX ADMIN — FOLIC ACID 1 MG: 1 TABLET ORAL at 08:02

## 2024-04-21 RX ADMIN — FUROSEMIDE 40 MG: 40 TABLET ORAL at 08:02

## 2024-04-21 RX ADMIN — HYDROXYZINE HYDROCHLORIDE 25 MG: 25 TABLET, FILM COATED ORAL at 19:41

## 2024-04-21 RX ADMIN — SODIUM CHLORIDE, PRESERVATIVE FREE 10 ML: 5 INJECTION INTRAVENOUS at 08:03

## 2024-04-21 RX ADMIN — Medication 3 MG: at 19:42

## 2024-04-21 RX ADMIN — AMIODARONE HYDROCHLORIDE 100 MG: 200 TABLET ORAL at 08:02

## 2024-04-21 RX ADMIN — CEFTRIAXONE SODIUM 2000 MG: 2 INJECTION, POWDER, FOR SOLUTION INTRAMUSCULAR; INTRAVENOUS at 16:57

## 2024-04-21 RX ADMIN — SODIUM CHLORIDE, PRESERVATIVE FREE 10 ML: 5 INJECTION INTRAVENOUS at 19:42

## 2024-04-21 RX ADMIN — RIVAROXABAN 20 MG: 20 TABLET, FILM COATED ORAL at 08:02

## 2024-04-21 RX ADMIN — PANTOPRAZOLE SODIUM 40 MG: 40 TABLET, DELAYED RELEASE ORAL at 09:17

## 2024-04-21 RX ADMIN — MICONAZOLE NITRATE: 2 POWDER TOPICAL at 19:42

## 2024-04-22 LAB
BACTERIA BLD AEROBE CULT: ABNORMAL
BACTERIA BLD AEROBE CULT: NORMAL
ORGANISM: ABNORMAL

## 2024-04-22 PROCEDURE — 2580000003 HC RX 258: Performed by: NURSE PRACTITIONER

## 2024-04-22 PROCEDURE — 6370000000 HC RX 637 (ALT 250 FOR IP)

## 2024-04-22 PROCEDURE — 6360000002 HC RX W HCPCS

## 2024-04-22 PROCEDURE — 1200000000 HC SEMI PRIVATE

## 2024-04-22 PROCEDURE — 99232 SBSQ HOSP IP/OBS MODERATE 35: CPT

## 2024-04-22 PROCEDURE — 97530 THERAPEUTIC ACTIVITIES: CPT

## 2024-04-22 PROCEDURE — 6370000000 HC RX 637 (ALT 250 FOR IP): Performed by: NURSE PRACTITIONER

## 2024-04-22 PROCEDURE — 2580000003 HC RX 258

## 2024-04-22 RX ORDER — FLUORIDE TOOTHPASTE
15 TOOTHPASTE DENTAL 3 TIMES DAILY PRN
Status: DISCONTINUED | OUTPATIENT
Start: 2024-04-22 | End: 2024-04-28 | Stop reason: HOSPADM

## 2024-04-22 RX ADMIN — AMIODARONE HYDROCHLORIDE 100 MG: 200 TABLET ORAL at 10:21

## 2024-04-22 RX ADMIN — SODIUM CHLORIDE, PRESERVATIVE FREE 10 ML: 5 INJECTION INTRAVENOUS at 20:06

## 2024-04-22 RX ADMIN — METOPROLOL SUCCINATE 25 MG: 25 TABLET, FILM COATED, EXTENDED RELEASE ORAL at 10:21

## 2024-04-22 RX ADMIN — Medication 1000 MCG: at 10:21

## 2024-04-22 RX ADMIN — CEFTRIAXONE SODIUM 2000 MG: 2 INJECTION, POWDER, FOR SOLUTION INTRAMUSCULAR; INTRAVENOUS at 17:17

## 2024-04-22 RX ADMIN — PANTOPRAZOLE SODIUM 40 MG: 40 TABLET, DELAYED RELEASE ORAL at 10:21

## 2024-04-22 RX ADMIN — FUROSEMIDE 40 MG: 40 TABLET ORAL at 10:21

## 2024-04-22 RX ADMIN — MICONAZOLE NITRATE: 2 POWDER TOPICAL at 10:22

## 2024-04-22 RX ADMIN — LEVOTHYROXINE SODIUM 88 MCG: 0.09 TABLET ORAL at 10:21

## 2024-04-22 RX ADMIN — FOLIC ACID 1 MG: 1 TABLET ORAL at 10:21

## 2024-04-22 RX ADMIN — SODIUM CHLORIDE, PRESERVATIVE FREE 10 ML: 5 INJECTION INTRAVENOUS at 10:21

## 2024-04-22 RX ADMIN — RIVAROXABAN 20 MG: 20 TABLET, FILM COATED ORAL at 10:21

## 2024-04-22 RX ADMIN — Medication 3 MG: at 20:06

## 2024-04-22 RX ADMIN — MICONAZOLE NITRATE: 2 POWDER TOPICAL at 20:06

## 2024-04-22 RX ADMIN — ACETAMINOPHEN 650 MG: 325 TABLET ORAL at 13:18

## 2024-04-22 NOTE — CARE COORDINATION
DISCHARGE PLANNING EVALUATION  4/22/24, 12:16 PM EDT    Reason for Referral: SNF placement   Decision Maker: Self with help from daughters   Current Services: None  New Services Requested: New SNF placement  Family/ Social/ Home environment: From home alone with support from daughters   Payment Source:[employer]  Transportation at Discharge: Undecided at this time  Post-acute (PAC) provider list was provided to patient. Patient was informed of their freedom to choose PAC provider. Discussed and offered to show the patient the relevant PAC Providers quality and resource use measures on Medicare Compare web site via computer based on patient's goals of care and treatment preferences. Questions regarding selection process were answered.      Teach Back Method used with Sherice regarding care plan and discharge planning  Patient and daughters verbalized understanding of the plan of care and contribute to goal setting.       Patient preferences and discharge plan: Patient and daughters both agreeable to referral to [facility], as patient has been there before. Their second choice would be [facility]. SW did leave a message for Genie at [facility] to check bed availability.     Made a referral to [facility] as a back up, however it was discovered in patient's chart that she receives chemo for breast cancer. That will need to be held while in SNF. SW attempted to speak with patient and family about this, however patient is sleeping and family is gone. Will follow up.    Electronically signed by YING Lira on 4/22/2024 at 12:16 PM

## 2024-04-23 PROCEDURE — 1200000000 HC SEMI PRIVATE

## 2024-04-23 PROCEDURE — 2580000003 HC RX 258

## 2024-04-23 PROCEDURE — 6370000000 HC RX 637 (ALT 250 FOR IP)

## 2024-04-23 PROCEDURE — 97530 THERAPEUTIC ACTIVITIES: CPT

## 2024-04-23 PROCEDURE — 6360000002 HC RX W HCPCS

## 2024-04-23 PROCEDURE — 2580000003 HC RX 258: Performed by: NURSE PRACTITIONER

## 2024-04-23 PROCEDURE — 6370000000 HC RX 637 (ALT 250 FOR IP): Performed by: NURSE PRACTITIONER

## 2024-04-23 PROCEDURE — 99232 SBSQ HOSP IP/OBS MODERATE 35: CPT

## 2024-04-23 PROCEDURE — 97110 THERAPEUTIC EXERCISES: CPT

## 2024-04-23 RX ADMIN — PANTOPRAZOLE SODIUM 40 MG: 40 TABLET, DELAYED RELEASE ORAL at 09:00

## 2024-04-23 RX ADMIN — SODIUM CHLORIDE, PRESERVATIVE FREE 10 ML: 5 INJECTION INTRAVENOUS at 21:00

## 2024-04-23 RX ADMIN — MICONAZOLE NITRATE: 2 POWDER TOPICAL at 09:00

## 2024-04-23 RX ADMIN — ACETAMINOPHEN 650 MG: 325 TABLET ORAL at 05:43

## 2024-04-23 RX ADMIN — CEFTRIAXONE SODIUM 2000 MG: 2 INJECTION, POWDER, FOR SOLUTION INTRAMUSCULAR; INTRAVENOUS at 17:59

## 2024-04-23 RX ADMIN — AMIODARONE HYDROCHLORIDE 100 MG: 200 TABLET ORAL at 09:53

## 2024-04-23 RX ADMIN — LEVOTHYROXINE SODIUM 88 MCG: 0.09 TABLET ORAL at 09:53

## 2024-04-23 RX ADMIN — MICONAZOLE NITRATE: 2 POWDER TOPICAL at 20:59

## 2024-04-23 RX ADMIN — METOPROLOL SUCCINATE 25 MG: 25 TABLET, FILM COATED, EXTENDED RELEASE ORAL at 09:53

## 2024-04-23 RX ADMIN — Medication 3 MG: at 20:58

## 2024-04-23 RX ADMIN — HYDROXYZINE HYDROCHLORIDE 25 MG: 25 TABLET, FILM COATED ORAL at 20:58

## 2024-04-23 RX ADMIN — SODIUM CHLORIDE, PRESERVATIVE FREE 10 ML: 5 INJECTION INTRAVENOUS at 09:53

## 2024-04-23 RX ADMIN — FOLIC ACID 1 MG: 1 TABLET ORAL at 09:53

## 2024-04-23 RX ADMIN — FUROSEMIDE 40 MG: 40 TABLET ORAL at 09:53

## 2024-04-23 RX ADMIN — RIVAROXABAN 20 MG: 20 TABLET, FILM COATED ORAL at 09:53

## 2024-04-23 ASSESSMENT — PAIN SCALES - GENERAL: PAINLEVEL_OUTOF10: 4

## 2024-04-23 ASSESSMENT — PAIN DESCRIPTION - DESCRIPTORS: DESCRIPTORS: ACHING

## 2024-04-23 ASSESSMENT — PAIN DESCRIPTION - LOCATION: LOCATION: HEAD

## 2024-04-23 NOTE — CARE COORDINATION
4/23/24, 11:05 AM EDT    DISCHARGE PLANNING EVALUATION    Spoke with Genie at [facility], no bed available.    Left message for Lynnette at [facility] regarding referral for [facility], requested return call.    11:29 AM  Lynnette from [facility] called, informed patient will not have chemo.  They can accept, will start precert when PT note is completed.

## 2024-04-24 PROCEDURE — 2580000003 HC RX 258: Performed by: NURSE PRACTITIONER

## 2024-04-24 PROCEDURE — 97110 THERAPEUTIC EXERCISES: CPT

## 2024-04-24 PROCEDURE — 2580000003 HC RX 258

## 2024-04-24 PROCEDURE — 6370000000 HC RX 637 (ALT 250 FOR IP)

## 2024-04-24 PROCEDURE — 1200000000 HC SEMI PRIVATE

## 2024-04-24 PROCEDURE — 6370000000 HC RX 637 (ALT 250 FOR IP): Performed by: NURSE PRACTITIONER

## 2024-04-24 PROCEDURE — 97530 THERAPEUTIC ACTIVITIES: CPT

## 2024-04-24 PROCEDURE — 6360000002 HC RX W HCPCS

## 2024-04-24 PROCEDURE — 99231 SBSQ HOSP IP/OBS SF/LOW 25: CPT | Performed by: NURSE PRACTITIONER

## 2024-04-24 RX ORDER — HYDROXYZINE HYDROCHLORIDE 25 MG/1
25 TABLET, FILM COATED ORAL 3 TIMES DAILY PRN
Qty: 30 TABLET | Refills: 0
Start: 2024-04-24 | End: 2024-04-28 | Stop reason: HOSPADM

## 2024-04-24 RX ORDER — AMIODARONE HYDROCHLORIDE 100 MG/1
100 TABLET ORAL DAILY
Qty: 30 TABLET | Refills: 0
Start: 2024-04-25

## 2024-04-24 RX ORDER — FOLIC ACID 1 MG/1
1 TABLET ORAL DAILY
Qty: 30 TABLET | Refills: 0
Start: 2024-04-25

## 2024-04-24 RX ADMIN — SODIUM CHLORIDE, PRESERVATIVE FREE 10 ML: 5 INJECTION INTRAVENOUS at 08:12

## 2024-04-24 RX ADMIN — LEVOTHYROXINE SODIUM 88 MCG: 0.09 TABLET ORAL at 08:12

## 2024-04-24 RX ADMIN — AMIODARONE HYDROCHLORIDE 100 MG: 200 TABLET ORAL at 08:12

## 2024-04-24 RX ADMIN — PANTOPRAZOLE SODIUM 40 MG: 40 TABLET, DELAYED RELEASE ORAL at 08:12

## 2024-04-24 RX ADMIN — MICONAZOLE NITRATE: 2 POWDER TOPICAL at 08:18

## 2024-04-24 RX ADMIN — CEFTRIAXONE SODIUM 2000 MG: 2 INJECTION, POWDER, FOR SOLUTION INTRAMUSCULAR; INTRAVENOUS at 17:11

## 2024-04-24 RX ADMIN — METOPROLOL SUCCINATE 25 MG: 25 TABLET, FILM COATED, EXTENDED RELEASE ORAL at 08:12

## 2024-04-24 RX ADMIN — Medication 1000 MCG: at 08:12

## 2024-04-24 RX ADMIN — FOLIC ACID 1 MG: 1 TABLET ORAL at 08:12

## 2024-04-24 RX ADMIN — RIVAROXABAN 20 MG: 20 TABLET, FILM COATED ORAL at 08:12

## 2024-04-24 RX ADMIN — FUROSEMIDE 40 MG: 40 TABLET ORAL at 08:12

## 2024-04-24 RX ADMIN — Medication 3 MG: at 20:40

## 2024-04-24 RX ADMIN — MICONAZOLE NITRATE: 2 POWDER TOPICAL at 20:41

## 2024-04-24 RX ADMIN — SODIUM CHLORIDE, PRESERVATIVE FREE 10 ML: 5 INJECTION INTRAVENOUS at 20:40

## 2024-04-24 ASSESSMENT — PAIN SCALES - GENERAL: PAINLEVEL_OUTOF10: 0

## 2024-04-24 NOTE — CARE COORDINATION
4/24/24, 7:59 AM EDT    DISCHARGE ON GOING EVALUATION    Sherice KUMAR [facility] day: 7  Location: 8A-17/017-A Reason for admit: Sepsis (HCC) [A41.9]     Procedures: na    Imaging since last note: no new    Barriers to Discharge: awaiting precert. Continue IV Rocephin at this time.     PCP: Yi Martin DO  Readmission Risk Score: 11.3%    Patient Goals/Plan/Treatment Preferences: From home alone, on daughter's property. Looking at placement for rehab stay at [facility]. Will need precert.

## 2024-04-24 NOTE — DISCHARGE INSTR - DIET

## 2024-04-24 NOTE — DISCHARGE INSTR - COC
Colostomy/Ileostomy/Ileal Conduit: No       Date of Last BM: 4/27/24      Intake/Output Summary (Last 24 hours) at 4/24/2024 1309  Last data filed at 4/24/2024 1036  Gross per 24 hour   Intake 330 ml   Output 0 ml   Net 330 ml     I/O last 3 completed shifts:  In: 400 [P.O.:400]  Out: 750 [Urine:750]    Safety Concerns:     At Risk for Falls    Impairments/Disabilities:      None    Nutrition Therapy:  Current Nutrition Therapy:   - Oral Diet:  General and Low Fat    Routes of Feeding: Oral  Liquids: Thin Liquids  Daily Fluid Restriction: no  Last Modified Barium Swallow with Video (Video Swallowing Test): not done    Treatments at the Time of Hospital Discharge:   Respiratory Treatments:   Oxygen Therapy:  is not on home oxygen therapy.  Ventilator:    - No ventilator support    Rehab Therapies: Physical Therapy and Occupational Therapy  Weight Bearing Status/Restrictions: No weight bearing restrictions  Other Medical Equipment (for information only, NOT a DME order):  wheelchair and walker  Other Treatments:     Patient's personal belongings (please select all that are sent with patient):  None    RN SIGNATURE:  Electronically signed by Lois Phelps RN on 4/27/24 at 11:50 AM EDT    CASE MANAGEMENT/SOCIAL WORK SECTION    Inpatient Status Date: 4/17/2024    Readmission Risk Assessment Score:  Readmission Risk              Risk of Unplanned Readmission:  12           Discharging to Facility/ Agency   Name: [facility]  Address: [address] [place] [address]  [id number]  Phone: [phone]  Fax: [phone]    Dialysis Facility (if applicable)   Name:  Address:  Dialysis Schedule:  Phone:  Fax:    / signature: Electronically signed by YING Castellano on 4/24/24 at 1:28 PM EDT    PHYSICIAN SECTION    Prognosis: Good    Condition at Discharge: Stable    Rehab Potential (if transferring to Rehab): Good    Recommended Labs or Other Treatments After Discharge: PT/OT. Monitor HR/BP. Complete

## 2024-04-25 LAB
ANION GAP SERPL CALC-SCNC: 13 MEQ/L (ref 8–16)
BUN SERPL-MCNC: 15 MG/DL (ref 7–22)
CALCIUM SERPL-MCNC: 9.1 MG/DL (ref 8.5–10.5)
CHLORIDE SERPL-SCNC: 102 MEQ/L (ref 98–111)
CO2 SERPL-SCNC: 23 MEQ/L (ref 23–33)
CREAT SERPL-MCNC: 0.8 MG/DL (ref 0.4–1.2)
GFR SERPL CREATININE-BSD FRML MDRD: 71 ML/MIN/1.73M2
GLUCOSE SERPL-MCNC: 133 MG/DL (ref 70–108)
POTASSIUM SERPL-SCNC: 4.5 MEQ/L (ref 3.5–5.2)
SODIUM SERPL-SCNC: 138 MEQ/L (ref 135–145)

## 2024-04-25 PROCEDURE — 97110 THERAPEUTIC EXERCISES: CPT

## 2024-04-25 PROCEDURE — 80048 BASIC METABOLIC PNL TOTAL CA: CPT

## 2024-04-25 PROCEDURE — 97530 THERAPEUTIC ACTIVITIES: CPT

## 2024-04-25 PROCEDURE — 2500000003 HC RX 250 WO HCPCS

## 2024-04-25 PROCEDURE — 2580000003 HC RX 258: Performed by: NURSE PRACTITIONER

## 2024-04-25 PROCEDURE — 99231 SBSQ HOSP IP/OBS SF/LOW 25: CPT | Performed by: NURSE PRACTITIONER

## 2024-04-25 PROCEDURE — 6370000000 HC RX 637 (ALT 250 FOR IP)

## 2024-04-25 PROCEDURE — 1200000000 HC SEMI PRIVATE

## 2024-04-25 PROCEDURE — 6360000002 HC RX W HCPCS

## 2024-04-25 PROCEDURE — 6370000000 HC RX 637 (ALT 250 FOR IP): Performed by: NURSE PRACTITIONER

## 2024-04-25 PROCEDURE — 97535 SELF CARE MNGMENT TRAINING: CPT

## 2024-04-25 PROCEDURE — 36415 COLL VENOUS BLD VENIPUNCTURE: CPT

## 2024-04-25 PROCEDURE — 2580000003 HC RX 258

## 2024-04-25 RX ADMIN — SODIUM CHLORIDE, PRESERVATIVE FREE 10 ML: 5 INJECTION INTRAVENOUS at 20:53

## 2024-04-25 RX ADMIN — MICONAZOLE NITRATE: 2 POWDER TOPICAL at 20:31

## 2024-04-25 RX ADMIN — PANTOPRAZOLE SODIUM 40 MG: 40 TABLET, DELAYED RELEASE ORAL at 10:01

## 2024-04-25 RX ADMIN — CEFTRIAXONE SODIUM 2000 MG: 2 INJECTION, POWDER, FOR SOLUTION INTRAMUSCULAR; INTRAVENOUS at 17:02

## 2024-04-25 RX ADMIN — ACETAMINOPHEN 650 MG: 325 TABLET ORAL at 11:35

## 2024-04-25 RX ADMIN — HYDROXYZINE HYDROCHLORIDE 25 MG: 25 TABLET, FILM COATED ORAL at 20:31

## 2024-04-25 RX ADMIN — MICONAZOLE NITRATE: 2 POWDER TOPICAL at 10:03

## 2024-04-25 RX ADMIN — Medication 3 MG: at 20:31

## 2024-04-25 RX ADMIN — RIVAROXABAN 20 MG: 20 TABLET, FILM COATED ORAL at 10:01

## 2024-04-25 RX ADMIN — METOPROLOL SUCCINATE 25 MG: 25 TABLET, FILM COATED, EXTENDED RELEASE ORAL at 10:03

## 2024-04-25 RX ADMIN — FOLIC ACID 1 MG: 1 TABLET ORAL at 10:01

## 2024-04-25 RX ADMIN — LEVOTHYROXINE SODIUM 88 MCG: 0.09 TABLET ORAL at 10:01

## 2024-04-25 RX ADMIN — SODIUM CHLORIDE, PRESERVATIVE FREE 10 ML: 5 INJECTION INTRAVENOUS at 10:03

## 2024-04-25 RX ADMIN — SODIUM CHLORIDE: 9 INJECTION, SOLUTION INTRAVENOUS at 17:01

## 2024-04-25 ASSESSMENT — PAIN DESCRIPTION - LOCATION: LOCATION: HEAD

## 2024-04-25 ASSESSMENT — PAIN DESCRIPTION - DESCRIPTORS: DESCRIPTORS: ACHING

## 2024-04-25 ASSESSMENT — PAIN - FUNCTIONAL ASSESSMENT: PAIN_FUNCTIONAL_ASSESSMENT: ACTIVITIES ARE NOT PREVENTED

## 2024-04-25 ASSESSMENT — PAIN SCALES - GENERAL
PAINLEVEL_OUTOF10: 4
PAINLEVEL_OUTOF10: 0

## 2024-04-26 PROCEDURE — 99232 SBSQ HOSP IP/OBS MODERATE 35: CPT | Performed by: NURSE PRACTITIONER

## 2024-04-26 PROCEDURE — 6370000000 HC RX 637 (ALT 250 FOR IP)

## 2024-04-26 PROCEDURE — 6370000000 HC RX 637 (ALT 250 FOR IP): Performed by: NURSE PRACTITIONER

## 2024-04-26 PROCEDURE — 2580000003 HC RX 258: Performed by: NURSE PRACTITIONER

## 2024-04-26 PROCEDURE — 1200000000 HC SEMI PRIVATE

## 2024-04-26 PROCEDURE — 97530 THERAPEUTIC ACTIVITIES: CPT

## 2024-04-26 PROCEDURE — 97110 THERAPEUTIC EXERCISES: CPT

## 2024-04-26 RX ORDER — SULFAMETHOXAZOLE AND TRIMETHOPRIM 800; 160 MG/1; MG/1
1 TABLET ORAL EVERY 12 HOURS SCHEDULED
Status: DISCONTINUED | OUTPATIENT
Start: 2024-04-26 | End: 2024-04-28 | Stop reason: HOSPADM

## 2024-04-26 RX ADMIN — Medication 1000 MCG: at 09:34

## 2024-04-26 RX ADMIN — METOPROLOL SUCCINATE 25 MG: 25 TABLET, FILM COATED, EXTENDED RELEASE ORAL at 09:34

## 2024-04-26 RX ADMIN — PANTOPRAZOLE SODIUM 40 MG: 40 TABLET, DELAYED RELEASE ORAL at 09:33

## 2024-04-26 RX ADMIN — MICONAZOLE NITRATE: 2 POWDER TOPICAL at 20:57

## 2024-04-26 RX ADMIN — SULFAMETHOXAZOLE AND TRIMETHOPRIM 1 TABLET: 800; 160 TABLET ORAL at 20:57

## 2024-04-26 RX ADMIN — LEVOTHYROXINE SODIUM 88 MCG: 0.09 TABLET ORAL at 09:34

## 2024-04-26 RX ADMIN — AMIODARONE HYDROCHLORIDE 100 MG: 200 TABLET ORAL at 09:34

## 2024-04-26 RX ADMIN — SODIUM CHLORIDE, PRESERVATIVE FREE 10 ML: 5 INJECTION INTRAVENOUS at 20:58

## 2024-04-26 RX ADMIN — ACETAMINOPHEN 650 MG: 325 TABLET ORAL at 12:10

## 2024-04-26 RX ADMIN — HYDROXYZINE HYDROCHLORIDE 25 MG: 25 TABLET, FILM COATED ORAL at 20:57

## 2024-04-26 RX ADMIN — RIVAROXABAN 20 MG: 20 TABLET, FILM COATED ORAL at 09:34

## 2024-04-26 RX ADMIN — SULFAMETHOXAZOLE AND TRIMETHOPRIM 1 TABLET: 800; 160 TABLET ORAL at 11:50

## 2024-04-26 RX ADMIN — SODIUM CHLORIDE, PRESERVATIVE FREE 10 ML: 5 INJECTION INTRAVENOUS at 09:36

## 2024-04-26 RX ADMIN — FUROSEMIDE 40 MG: 40 TABLET ORAL at 09:34

## 2024-04-26 RX ADMIN — MICONAZOLE NITRATE: 2 POWDER TOPICAL at 09:34

## 2024-04-26 RX ADMIN — Medication 3 MG: at 20:57

## 2024-04-26 RX ADMIN — FOLIC ACID 1 MG: 1 TABLET ORAL at 09:34

## 2024-04-26 NOTE — CARE COORDINATION
4/26/24, 12:54 PM EDT    DISCHARGE ON GOING EVALUATION    Sherice KUMAR [facility] day: 9  Location: 8A-17/017-A Reason for admit: Sepsis (HCC) [A41.9]     Procedures: na    Imaging since last note: none    Barriers to Discharge: Awaiting Precert. Transitioned to PO Bactrim.     PCP: Yi Martin DO  Readmission Risk Score: 10.8    Patient Goals/Plan/Treatment Preferences: From home alone, on daughter's property. Looking at placement for rehab stay at [facility]. Will need precert.

## 2024-04-26 NOTE — CARE COORDINATION
4/26/24, 11:09 AM EDT    DISCHARGE PLANNING EVALUATION    Spoke with daughter Yolanda regarding length of time waiting for precert.  Described the process, waiting on [employer] to review and make decision. Yolanda stated that family can transport.

## 2024-04-27 PROCEDURE — 1200000000 HC SEMI PRIVATE

## 2024-04-27 PROCEDURE — 6370000000 HC RX 637 (ALT 250 FOR IP): Performed by: NURSE PRACTITIONER

## 2024-04-27 PROCEDURE — 6370000000 HC RX 637 (ALT 250 FOR IP)

## 2024-04-27 PROCEDURE — 2580000003 HC RX 258: Performed by: NURSE PRACTITIONER

## 2024-04-27 PROCEDURE — 99231 SBSQ HOSP IP/OBS SF/LOW 25: CPT | Performed by: NURSE PRACTITIONER

## 2024-04-27 RX ORDER — GUAIFENESIN/DEXTROMETHORPHAN 100-10MG/5
5 SYRUP ORAL EVERY 4 HOURS PRN
Status: DISCONTINUED | OUTPATIENT
Start: 2024-04-27 | End: 2024-04-28 | Stop reason: HOSPADM

## 2024-04-27 RX ORDER — SULFAMETHOXAZOLE AND TRIMETHOPRIM 800; 160 MG/1; MG/1
1 TABLET ORAL EVERY 12 HOURS SCHEDULED
Qty: 7 TABLET | Refills: 0
Start: 2024-04-27 | End: 2024-04-28

## 2024-04-27 RX ADMIN — SODIUM CHLORIDE, PRESERVATIVE FREE 10 ML: 5 INJECTION INTRAVENOUS at 07:58

## 2024-04-27 RX ADMIN — FOLIC ACID 1 MG: 1 TABLET ORAL at 07:57

## 2024-04-27 RX ADMIN — ACETAMINOPHEN 650 MG: 325 TABLET ORAL at 03:57

## 2024-04-27 RX ADMIN — MICONAZOLE NITRATE: 2 POWDER TOPICAL at 20:43

## 2024-04-27 RX ADMIN — FUROSEMIDE 40 MG: 40 TABLET ORAL at 07:57

## 2024-04-27 RX ADMIN — METOPROLOL SUCCINATE 25 MG: 25 TABLET, FILM COATED, EXTENDED RELEASE ORAL at 07:57

## 2024-04-27 RX ADMIN — MICONAZOLE NITRATE: 2 POWDER TOPICAL at 07:58

## 2024-04-27 RX ADMIN — AMIODARONE HYDROCHLORIDE 100 MG: 200 TABLET ORAL at 07:57

## 2024-04-27 RX ADMIN — SULFAMETHOXAZOLE AND TRIMETHOPRIM 1 TABLET: 800; 160 TABLET ORAL at 20:43

## 2024-04-27 RX ADMIN — SODIUM CHLORIDE, PRESERVATIVE FREE 10 ML: 5 INJECTION INTRAVENOUS at 20:43

## 2024-04-27 RX ADMIN — SULFAMETHOXAZOLE AND TRIMETHOPRIM 1 TABLET: 800; 160 TABLET ORAL at 07:57

## 2024-04-27 RX ADMIN — RIVAROXABAN 20 MG: 20 TABLET, FILM COATED ORAL at 07:56

## 2024-04-27 RX ADMIN — Medication 3 MG: at 20:43

## 2024-04-27 RX ADMIN — PANTOPRAZOLE SODIUM 40 MG: 40 TABLET, DELAYED RELEASE ORAL at 07:56

## 2024-04-27 RX ADMIN — LEVOTHYROXINE SODIUM 88 MCG: 0.09 TABLET ORAL at 07:57

## 2024-04-27 ASSESSMENT — PAIN DESCRIPTION - DESCRIPTORS: DESCRIPTORS: ACHING;DISCOMFORT

## 2024-04-27 ASSESSMENT — PAIN - FUNCTIONAL ASSESSMENT: PAIN_FUNCTIONAL_ASSESSMENT: ACTIVITIES ARE NOT PREVENTED

## 2024-04-27 ASSESSMENT — PAIN DESCRIPTION - LOCATION: LOCATION: BUTTOCKS

## 2024-04-28 VITALS
HEIGHT: 62 IN | SYSTOLIC BLOOD PRESSURE: 116 MMHG | HEART RATE: 76 BPM | DIASTOLIC BLOOD PRESSURE: 59 MMHG | BODY MASS INDEX: 39.35 KG/M2 | OXYGEN SATURATION: 92 % | TEMPERATURE: 97.2 F | RESPIRATION RATE: 18 BRPM | WEIGHT: 213.85 LBS

## 2024-04-28 PROCEDURE — 6370000000 HC RX 637 (ALT 250 FOR IP)

## 2024-04-28 PROCEDURE — 6370000000 HC RX 637 (ALT 250 FOR IP): Performed by: NURSE PRACTITIONER

## 2024-04-28 PROCEDURE — 99239 HOSP IP/OBS DSCHRG MGMT >30: CPT | Performed by: NURSE PRACTITIONER

## 2024-04-28 PROCEDURE — 2580000003 HC RX 258: Performed by: NURSE PRACTITIONER

## 2024-04-28 RX ORDER — SULFAMETHOXAZOLE AND TRIMETHOPRIM 800; 160 MG/1; MG/1
1 TABLET ORAL EVERY 12 HOURS SCHEDULED
Qty: 6 TABLET | Refills: 0 | Status: SHIPPED | OUTPATIENT
Start: 2024-04-28 | End: 2024-05-01

## 2024-04-28 RX ADMIN — MICONAZOLE NITRATE: 2 POWDER TOPICAL at 07:41

## 2024-04-28 RX ADMIN — FUROSEMIDE 40 MG: 40 TABLET ORAL at 07:39

## 2024-04-28 RX ADMIN — FOLIC ACID 1 MG: 1 TABLET ORAL at 07:40

## 2024-04-28 RX ADMIN — SODIUM CHLORIDE, PRESERVATIVE FREE 10 ML: 5 INJECTION INTRAVENOUS at 07:41

## 2024-04-28 RX ADMIN — LEVOTHYROXINE SODIUM 88 MCG: 0.09 TABLET ORAL at 07:40

## 2024-04-28 RX ADMIN — Medication 1000 MCG: at 07:39

## 2024-04-28 RX ADMIN — SULFAMETHOXAZOLE AND TRIMETHOPRIM 1 TABLET: 800; 160 TABLET ORAL at 07:39

## 2024-04-28 RX ADMIN — RIVAROXABAN 20 MG: 20 TABLET, FILM COATED ORAL at 07:39

## 2024-04-28 RX ADMIN — AMIODARONE HYDROCHLORIDE 100 MG: 200 TABLET ORAL at 07:39

## 2024-04-28 RX ADMIN — METOPROLOL SUCCINATE 25 MG: 25 TABLET, FILM COATED, EXTENDED RELEASE ORAL at 07:40

## 2024-04-28 RX ADMIN — HYDROXYZINE HYDROCHLORIDE 25 MG: 25 TABLET, FILM COATED ORAL at 07:39

## 2024-04-28 RX ADMIN — PANTOPRAZOLE SODIUM 40 MG: 40 TABLET, DELAYED RELEASE ORAL at 07:39

## 2024-04-28 NOTE — DISCHARGE SUMMARY
Hospital Medicine Discharge Summary      Patient Identification:   Sherice Connolly   : 1935  MRN: [id number]   Account: [id number]      Patient's PCP: Yi Martin DO    Admit Date: 2024     Discharge Date: 2024      Admitting Physician: Alex Allred MD     Discharge Physician: Gaye Palmer, APRN - CNP     Discharge Diagnoses and Hospital Course:    Presented to the ED with AMS, admitted to the ICU. Transferred out of ICU  under the care of the hospitalist service.    Septic shock, resolved. Due to E. Coli bacteremia/complicated UTI: Patient presented with septic shock on arrival requiring vasopressor support s/p IVF resuscitation.  Weaned off of vasopressor support. Blood culture  initially + E. Coli sensitive to rocephin. Urine culture growing E. Coli, sensitive to rocephin.  Initially on vancomycin and Zosyn was , transition to ceftriaxone 2 g daily IV-. Transition to PO Bactrim  for a 5 day course. Rx given.    Acute toxic metabolic encephalopathy, resolved: Due to #1.    Troponin elevation: High-sensitivity troponin 261, 258 on admission.  EKG at that time demonstrated T wave inversions in lateral leads.  Cardiology consulted for consideration of NSTEMI. Cardiology recommending heart catheterization- family and patient declined heart cath or further intervention.    LESLIE on CKD, resolved: Creatinine 1.9 on admission, improved to 0.8.  Baseline creatinine appears between 1-1.2.  Continue home medications.  Avoided nephrotoxic agents and renally dosed medications.   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  Adjuvant chemotherapy with Adriamycin and cytoxan on 23, 4 cycles completed on 24.  Thereafter began weekly Taxol/Herceptin therapy 3/21/2024, due for repeat dose 2024 but was withheld because of cytopenia.  Follows with heme-onc with [facility]

## 2024-04-28 NOTE — PROGRESS NOTES
Hospitalist Progress Note    Patient:  Sherice Connolly    YOB: 1935  Unit/Bed:8A-17/017-A  Date of Admission: 4/17/2024  Code Status: DNR-CCA      Assessment/Plan:    Septic shock due to E. Coli bacteremia/complicated UTI: Patient presented with septic shock on arrival requiring vasopressor support s/p IVF resuscitation.  Weaned off of vasopressor support.  Blood culture 1/2 initially neg x 48 hrs, now growing E. Coli sensitive to rocephin.   Urine culture growing E. Coli, sensitive to rocephin.   Initially on vancomycin and Zosyn was 4/16, transition to ceftriaxone 2 g daily IV, for 7 days.    Altered mental status: Suspect due to #1.  Resolved-Per family at bedside, patient at baseline.  Alert and oriented to self, location, and somewhat aware of her situation. Not oriented to time. Conversationally confused.     Troponin elevation: High-sensitivity troponin 261, 258 on admission.  EKG at that time demonstrated T wave inversions in lateral leads.  Cardiology consulted for consideration of NSTEMI.  Cardiology recommending heart catheterization, family to discuss and decide whether they would like to proceed with this intervention.    LESLIE on CKD: Creatinine 1.9 on admission, improved to 1.3.  Baseline creatinine appears between 1-1.2.  Continue home medications.  Avoid nephrotoxic agents and renally dose medications.  Daily BMP.    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  Adjuvant chemotherapy with Adriamycin and cytoxan on 12/28/23, 4 cycles completed on 2/29/24.  Thereafter began weekly Taxol/Herceptin therapy 3/21/2024, due for repeat dose 4/4/2024 but was withheld because of cytopenia.  Follows with heme-onc with [facility].    Chronic HFmrEF: Per MUGA scan April 9, 2024 showed EF 44 to 54%, consistent with baseline from November 2023.  No evidence of left ventricular enlargement, and 
        Hospitalist Progress Note    Patient:  Sherice Connolly    YOB: 1935  Unit/Bed:8A-17/017-A  Date of Admission: 4/17/2024  Code Status: DNR-CCA      Assessment/Plan:    Septic shock due to E. Coli bacteremia/complicated UTI: Patient presented with septic shock on arrival requiring vasopressor support s/p IVF resuscitation.  Weaned off of vasopressor support.  Blood culture 1/2 initially neg x 48 hrs, now growing E. Coli sensitive to rocephin.   Urine culture growing E. Coli, sensitive to rocephin.   Initially on vancomycin and Zosyn was 4/16, transition to ceftriaxone 2 g daily IV- will need 14 days total treatment give bacteremia.    Acute toxic metabolic encephalopathy, resolved: Suspect due to #1. Per family at bedside, patient at baseline.  Alert and oriented to self, location, and somewhat aware of her situation. Not oriented to time. Conversationally confused.     Troponin elevation: High-sensitivity troponin 261, 258 on admission.  EKG at that time demonstrated T wave inversions in lateral leads.  Cardiology consulted for consideration of NSTEMI.  Cardiology recommending heart catheterization- family and patient declined heart cath or further intervention    LESLIE on CKD, resolved: Creatinine 1.9 on admission, improved to 1.3.  Baseline creatinine appears between 1-1.2.  Continue home medications.  Avoid nephrotoxic agents and renally dose medications.  Daily BMP.    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  Adjuvant chemotherapy with Adriamycin and cytoxan on 12/28/23, 4 cycles completed on 2/29/24.  Thereafter began weekly Taxol/Herceptin therapy 3/21/2024, due for repeat dose 4/4/2024 but was withheld because of cytopenia.  Follows with heme-onc with [facility].  No lymph nodes removed with mastectomy on the left side. Prefer blood draws on left arm.     Chronic HFmrEF: Per MUGA scan 
        Hospitalist Progress Note    Patient:  Sherice Connolly    YOB: 1935  Unit/Bed:8A-17/017-A  Date of Admission: 4/17/2024  Code Status: DNR-CCA      Assessment/Plan:    Septic shock due to E. Coli bacteremia/complicated UTI: Patient presented with septic shock on arrival requiring vasopressor support s/p IVF resuscitation.  Weaned off of vasopressor support.  Blood culture 1/2 initially neg x 48 hrs, now growing E. Coli sensitive to rocephin.   Urine culture growing E. Coli, sensitive to rocephin.   Initially on vancomycin and Zosyn was 4/16, transition to ceftriaxone 2 g daily IV- will need 14 days total treatment give bacteremia.    Altered mental status, resolved: Suspect due to #1. Per family at bedside, patient at baseline.  Alert and oriented to self, location, and somewhat aware of her situation. Not oriented to time. Conversationally confused.     Troponin elevation: High-sensitivity troponin 261, 258 on admission.  EKG at that time demonstrated T wave inversions in lateral leads.  Cardiology consulted for consideration of NSTEMI.  Cardiology recommending heart catheterization- family and patient declined heart cath or further intervention    LESLIE on CKD, resolved: Creatinine 1.9 on admission, improved to 1.3.  Baseline creatinine appears between 1-1.2.  Continue home medications.  Avoid nephrotoxic agents and renally dose medications.  Daily BMP.    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  Adjuvant chemotherapy with Adriamycin and cytoxan on 12/28/23, 4 cycles completed on 2/29/24.  Thereafter began weekly Taxol/Herceptin therapy 3/21/2024, due for repeat dose 4/4/2024 but was withheld because of cytopenia.  Follows with heme-onc with [facility].  No lymph nodes removed with mastectomy on the left side. Prefer blood draws on left arm.     Chronic HFmrEF: Per MUGA scan April 9, 2024 
        Hospitalist Progress Note    Patient:  Sherice Connolly    YOB: 1935  Unit/Bed:8A-17/017-A  Date of Admission: 4/17/2024  Code Status: DNR-CCA      Assessment/Plan:    Septic shock due to E. Coli bacteremia/complicated UTI: Patient presented with septic shock on arrival requiring vasopressor support s/p IVF resuscitation.  Weaned off of vasopressor support.  Blood culture 1/2 initially neg x 48 hrs, now growing gram neg bacilli. Bio fire + for enterobacterales, E. Coli- continue Rocephin  Urine culture growing E. Coli, sensitive to rocephin.   Initially on vancomycin and Zosyn was 4/16, transition to ceftriaxone 2 g daily IV, for 7 days.    Altered mental status: Suspect due to #1.  Resolved-Per family at bedside, patient at baseline.  Alert and oriented to self, location, and somewhat aware of her situation. Not oriented to time. Conversationally confused.     Troponin elevation: High-sensitivity troponin 261, 258 on admission.  EKG at that time demonstrated T wave inversions in lateral leads.  Cardiology consulted for consideration of NSTEMI.  Cardiology recommending heart catheterization, family to discuss and decide whether they would like to proceed with this intervention.    LESLIE on CKD: Creatinine 1.9 on admission, improved to 1.3.  Baseline creatinine appears between 1-1.2.  Continue home medications.  Avoid nephrotoxic agents and renally dose medications.  Daily BMP.    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  Adjuvant chemotherapy with Adriamycin and cytoxan on 12/28/23, 4 cycles completed on 2/29/24.  Thereafter began weekly Taxol/Herceptin therapy 3/21/2024, due for repeat dose 4/4/2024 but was withheld because of cytopenia.  Follows with heme-onc with [facility].    Chronic HFmrEF: Per MUGA scan April 9, 2024 showed EF 44 to 54%, consistent with baseline from November 2023.  No 
        Hospitalist Progress Note    Patient:  Sherice Connolly    YOB: 1935  Unit/Bed:8A-17/017-A  Date of Admission: 4/17/2024  Code Status: DNR-CCA      Assessment/Plan:    Septic shock due to E. Coli bacteremia/complicated UTI: Patient presented with septic shock on arrival requiring vasopressor support s/p IVF resuscitation.  Weaned off of vasopressor support. Blood culture 1/2 initially + E. Coli sensitive to rocephin. Urine culture growing E. Coli, sensitive to rocephin.  Initially on vancomycin and Zosyn was 4/16, transition to ceftriaxone 2 g daily IV- Has had 9 days of IV ATB, will need 14 days total treatment give bacteremia. Transition to PO Bactrim 4/26 for a 5 day course.     Acute toxic metabolic encephalopathy, resolved: Suspect due to #1. Per family at bedside, patient at baseline.  Alert and oriented to self, location, and somewhat aware of her situation. Not oriented to time.     Troponin elevation: High-sensitivity troponin 261, 258 on admission.  EKG at that time demonstrated T wave inversions in lateral leads.  Cardiology consulted for consideration of NSTEMI. Cardiology recommending heart catheterization- family and patient declined heart cath or further intervention    LESLIE on CKD, resolved: Creatinine 1.9 on admission, improved to 0.8.  Baseline creatinine appears between 1-1.2.  Continue home medications.  Avoid nephrotoxic agents and renally dose medications.    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  Adjuvant chemotherapy with Adriamycin and cytoxan on 12/28/23, 4 cycles completed on 2/29/24.  Thereafter began weekly Taxol/Herceptin therapy 3/21/2024, due for repeat dose 4/4/2024 but was withheld because of cytopenia.  Follows with heme-onc with [facility]. No lymph nodes removed with mastectomy on the left side. Prefer blood draws on left arm.     Chronic HFmrEF: Per MUGA 
        Hospitalist Progress Note    Patient:  Sherice Connolly    YOB: 1935  Unit/Bed:8A-17/017-A  Date of Admission: 4/17/2024  Code Status: DNR-CCA      Assessment/Plan:    Septic shock due to E. Coli bacteremia/complicated UTI: Patient presented with septic shock on arrival requiring vasopressor support s/p IVF resuscitation.  Weaned off of vasopressor support. Blood culture 1/2 initially + E. Coli sensitive to rocephin. Urine culture growing E. Coli, sensitive to rocephin.  Initially on vancomycin and Zosyn was 4/16, transition to ceftriaxone 2 g daily IV- Has had 9 days of IV ATB, will need 14 days total treatment give bacteremia. Transition to PO Bactrim on today for a 5 day course.     Acute toxic metabolic encephalopathy, resolved: Suspect due to #1. Per family at bedside, patient at baseline.  Alert and oriented to self, location, and somewhat aware of her situation. Not oriented to time.     Troponin elevation: High-sensitivity troponin 261, 258 on admission.  EKG at that time demonstrated T wave inversions in lateral leads.  Cardiology consulted for consideration of NSTEMI. Cardiology recommending heart catheterization- family and patient declined heart cath or further intervention    LESLIE on CKD, resolved: Creatinine 1.9 on admission, improved to 0.8.  Baseline creatinine appears between 1-1.2.  Continue home medications.  Avoid nephrotoxic agents and renally dose medications.    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  Adjuvant chemotherapy with Adriamycin and cytoxan on 12/28/23, 4 cycles completed on 2/29/24.  Thereafter began weekly Taxol/Herceptin therapy 3/21/2024, due for repeat dose 4/4/2024 but was withheld because of cytopenia.  Follows with heme-onc with [facility]. No lymph nodes removed with mastectomy on the left side. Prefer blood draws on left arm.     Chronic HFmrEF: Per 
        Hospitalist Progress Note    Patient:  Sherice Connolly    YOB: 1935  Unit/Bed:8A-17/017-A  Date of Admission: 4/17/2024  Code Status: DNR-CCA      Assessment/Plan:    Septic shock due to E. Coli bacteremia/complicated UTI: Patient presented with septic shock on arrival requiring vasopressor support s/p IVF resuscitation.  Weaned off of vasopressor support. Blood culture 1/2 initially + E. Coli sensitive to rocephin. Urine culture growing E. Coli, sensitive to rocephin.  Initially on vancomycin and Zosyn was 4/16, transition to ceftriaxone 2 g daily IV- will need 14 days total treatment give bacteremia. Will transition to PO Bactrim on discharge to complete 14 day course.     Acute toxic metabolic encephalopathy, resolved: Suspect due to #1. Per family at bedside, patient at baseline.  Alert and oriented to self, location, and somewhat aware of her situation. Not oriented to time.     Troponin elevation: High-sensitivity troponin 261, 258 on admission.  EKG at that time demonstrated T wave inversions in lateral leads.  Cardiology consulted for consideration of NSTEMI. Cardiology recommending heart catheterization- family and patient declined heart cath or further intervention    LESLIE on CKD, resolved: Creatinine 1.9 on admission, improved to 1.3.  Baseline creatinine appears between 1-1.2.  Continue home medications.  Avoid nephrotoxic agents and renally dose medications.    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  Adjuvant chemotherapy with Adriamycin and cytoxan on 12/28/23, 4 cycles completed on 2/29/24.  Thereafter began weekly Taxol/Herceptin therapy 3/21/2024, due for repeat dose 4/4/2024 but was withheld because of cytopenia.  Follows with heme-onc with [facility]. No lymph nodes removed with mastectomy on the left side. Prefer blood draws on left arm.     Chronic HFmrEF: Per MUGA scan 
        Hospitalist Progress Note    Patient:  Sherice Connolly    YOB: 1935  Unit/Bed:8A-17/017-A  Date of Admission: 4/17/2024  Code Status: DNR-CCA      Assessment/Plan:    Septic shock due to complicated UTI: Patient presented with septic shock on arrival requiring vasopressor support s/p IVF resuscitation.  Weaned off of vasopressor support.  Urine culture growing E. Coli, sensitive to rocephin.  Initially on vancomycin and Zosyn was 4/16, transition to ceftriaxone 2 g daily IV, continue.    Altered mental status: Suspect due to #1.  Resolved-Per family at bedside, patient at baseline.  Alert and oriented to self, location, and somewhat aware of her situation. Not oriented to time. Conversationally confused.     Troponin elevation: High-sensitivity troponin 261, 258 on admission.  EKG at that time demonstrated T wave inversions in lateral leads.  Cardiology consulted for consideration of NSTEMI.  Cardiology recommending heart catheterization, family to discuss and decide whether they would like to proceed with this intervention.    LESLIE on CKD: Creatinine 1.9 on admission, improved to 1.3.  Baseline creatinine appears between 1-1.2.  Continue home medications.  Avoid nephrotoxic agents and renally dose medications.  Daily BMP.    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  Adjuvant chemotherapy with Adriamycin and cytoxan on 12/28/23, 4 cycles completed on 2/29/24.  Thereafter began weekly Taxol/Herceptin therapy 3/21/2024, due for repeat dose 4/4/2024 but was withheld because of cytopenia.  Follows with heme[facility]onc with [facility].    Chronic HFmrEF: Per MUGA scan April 9, 2024 showed EF 44 to 54%, consistent with baseline from November 2023.  No evidence of left ventricular enlargement, and systolic and diastolic values were WNL.  Does not appear fluid overloaded.  Repeat echo pending.  I's and O's.  
        Hospitalist Progress Note    Patient:  Sherice Connolly    YOB: 1935  Unit/Bed:8A-17/017-A  Date of Admission: 4/17/2024  Code Status: DNR-CCA      Assessment/Plan:    Septic shock due to complicated UTI: Patient presented with septic shock on arrival requiring vasopressor support s/p IVF resuscitation.  Weaned off of vasopressor support.  Urine culture showing gram-negative bacilli, await further sensitivities.  Initially on vancomycin and Zosyn was 4/16, transition to ceftriaxone 2 g daily IV, continue.    Altered mental status: Suspect due to #1.  Improving.  Alert and oriented to self, location, and somewhat aware of her situation. Not oriented to time. Conversationally confused.     Troponin elevation: High-sensitivity troponin 261, 258 on admission.  EKG at that time demonstrated T wave inversions in lateral leads.  Cardiology consulted for consideration of NSTEMI.    LESLIE on CKD: Creatinine 1.9 on admission, improved to 1.3.  Baseline creatinine appears between 1-1.2.  Continue home medications.  Avoid nephrotoxic agents and renally dose medications.  Daily BMP.    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  Adjuvant chemotherapy with Adriamycin and cytoxan on 12/28/23, 4 cycles completed on 2/29/24.  Thereafter began weekly Taxol/Herceptin therapy 3/21/2024, due for repeat dose 4/4/2024 but was withheld because of cytopenia.  Follows with heme-onc with [facility].    Chronic HFmrEF: Per MUGA scan April 9, 2024 showed EF 44 to 54%, consistent with baseline from November 2023.  No evidence of left ventricular enlargement, and systolic and diastolic values were WNL.  Does not appear fluid overloaded.  Repeat echo pending.  I's and O's.  Daily weights.    A-fib, likely secondary hypercoagulable state: On Xarelto for OAC.  Continue metoprolol and amiodarone.  Patient remains rate 
        Hospitalist Progress Note    Patient:  Sherice Connolly    YOB: 1935  Unit/Bed:8A-17/017-A  Date of Admission: 4/17/2024  Code Status: DNR-CCA      Assessment/Plan:    Septic shock due to complicated UTI: Patient presented with septic shock on arrival requiring vasopressor support s/p IVF resuscitation.  Weaned off of vasopressor support.  Urine culture showing gram-negative bacilli, await further sensitivities.  Initially on vancomycin and Zosyn was 4/16, transition to ceftriaxone 2 g daily IV, continue.    Altered mental status: Suspect due to #1.  Resolved-Per family at bedside, patient roughly at baseline.  Alert and oriented to self, location, and somewhat aware of her situation. Not oriented to time. Conversationally confused.     Troponin elevation: High-sensitivity troponin 261, 258 on admission.  EKG at that time demonstrated T wave inversions in lateral leads.  Cardiology consulted for consideration of NSTEMI.  Cardiology recommending heart catheterization, family to discuss and decide whether they would like to proceed with this intervention.    LESLIE on CKD: Creatinine 1.9 on admission, improved to 1.3.  Baseline creatinine appears between 1-1.2.  Continue home medications.  Avoid nephrotoxic agents and renally dose medications.  Daily BMP.    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  Adjuvant chemotherapy with Adriamycin and cytoxan on 12/28/23, 4 cycles completed on 2/29/24.  Thereafter began weekly Taxol/Herceptin therapy 3/21/2024, due for repeat dose 4/4/2024 but was withheld because of cytopenia.  Follows with heme-onc with [facility].    Chronic HFmrEF: Per MUGA scan April 9, 2024 showed EF 44 to 54%, consistent with baseline from November 2023.  No evidence of left ventricular enlargement, and systolic and diastolic values were WNL.  Does not appear fluid overloaded.  Repeat 
      Pharmacy Renal Adjustment    Pharmacy renally adjusted the following medication(s) per P&T approved policy: xarelto    Recent Labs     04/17/24  0440   BUN 29*   CREATININE 1.9*     Estimated Creatinine Clearance: 23 mL/min (A) (based on SCr of 1.9 mg/dL (H)).    Assessment:  LESLIE    Plan:   Decrease Xarelto from 20mg daily to 15mg daily.  Will monitor renal function. When it returns to baseline, will increase dose back to 20mg once daily    Please call pharmacy with any questions.    Mariana Driscoll, Pharm.D., BCPS, BCCCP 4/17/2024 7:41 AM        
  Physician Progress Note      PATIENT:               ELIA PEREZ  CSN #:                  [id number]  :                       1935  ADMIT DATE:       2024 4:02 AM  DISCH DATE:  RESPONDING  PROVIDER #:        Vero Aly DO          QUERY TEXT:    Pt admitted with sepsis with septic shock due to UTI. Pt noted to have AMS   that is improving with treatment. If possible, please document in the progress   notes and discharge summary if you are evaluating and / or treating any of   the following:    The medical record reflects the following:    Risk Factors: sepsis, UTI  Clinical Indicators: presents with worsening confusion, on arrival alert and   oriented to person only, AMS improving, oriented to person and place now  Treatment: IV Rocephin, IV Vancomycin, continuous IVF    Thank you!    Kashmir Cavanaugh, RAMBON,RN, CRCR  RN Clinical   Options provided:  -- Metabolic encephalopathy  -- Septic encephalopathy  -- Toxic encephalopathy  -- Toxic metabolic encephalopathy  -- Other - I will add my own diagnosis  -- Disagree - Not applicable / Not valid  -- Disagree - Clinically unable to determine / Unknown  -- Refer to Clinical Documentation Reviewer    PROVIDER RESPONSE TEXT:    This patient has toxic metabolic encephalopathy.    Query created by: Kashmir Cavanaugh on 2024 12:07 PM      Electronically signed by:  Vero Aly DO 2024 12:10 PM          
 [facility]  INPATIENT PHYSICAL THERAPY  DAILY NOTE  [facility] MED SURG 8AB - 8A-17/017-A    Time In: 0739  Time Out: 0808  Timed Code Treatment Minutes: 29 Minutes  Minutes: 29          Date: 2024  Patient Name: Sherice Connolly,  Gender:  female        MRN: [id number]  : 1935  (89 y.o.)     Referring Practitioner: Dr. JOSÉ Vázquez  Diagnosis: sepsis  Additional Pertinent Hx: 89-year-old female presenting to ICU in septic shock as a transfer from [facility].     Patient originally presented to [facility] ED for chief complaint of confusion.  Patient's sister said that she was fine in the morning, mental status decreased throughout the day.  She has had chemo treatment 5 days prior.  Denies emesis, diarrhea, cough, congestion.  History of breast cancer s/p double mastectomy in 2023.     While in their ED patient found to have sepsis treated empirically with vancomycin, Zosyn,1500cc IVF, remained hypotensive started on Levophed.  CXR showed scattered bandlike opacities throughout the lungs decrease compared to prior study.  No areas of increasing lung consolidation.     Prior Level of Function:  Lives With: Alone  Type of Home: House  Home Layout: One level  Home Access: Level entry  Home Equipment: Lift chair, Wheelchair-manual, Walker, rolling (trapeze and BR on bed, has upright walker)   Bathroom Shower/Tub: Walk-in shower  Bathroom Toilet: Handicap height  Bathroom Equipment: Grab bars in shower, Shower chair, Grab bars around toilet    Receives Help From: Family  ADL Assistance: Needs assistance (daughter will assist with shower; pt primarily able to complete sponge bath and dressing without assistance although requires increased time; independnet with toileting and transfers)  Homemaking Assistance: Needs assistance (family completes all)  Ambulation Assistance: Needs assistance  Transfer Assistance: Independent  Active : No  Additional Comments: pt's home is only several 
 [facility]  INPATIENT PHYSICAL THERAPY  DAILY NOTE  STR[facility] MED SURG 8AB - 8A-17/017-A    Time In: 0734  Time Out: 0812  Timed Code Treatment Minutes: 38 Minutes  Minutes: 38          Date: 2024  Patient Name: Sherice Connolly,  Gender:  female        MRN: [id number]  : 1935  (89 y.o.)     Referring Practitioner: Dr. JOSÉ Vázquez  Diagnosis: sepsis  Additional Pertinent Hx: 89-year-old female presenting to ICU in septic shock as a transfer from [facility].     Patient originally presented to [facility] ED for chief complaint of confusion.  Patient's sister said that she was fine in the morning, mental status decreased throughout the day.  She has had chemo treatment 5 days prior.  Denies emesis, diarrhea, cough, congestion.  History of breast cancer s/p double mastectomy in 2023.     While in their ED patient found to have sepsis treated empirically with vancomycin, Zosyn,1500cc IVF, remained hypotensive started on Levophed.  CXR showed scattered bandlike opacities throughout the lungs decrease compared to prior study.  No areas of increasing lung consolidation.     Prior Level of Function:  Lives With: Alone  Type of Home: House  Home Layout: One level  Home Access: Level entry  Home Equipment: Lift chair, Wheelchair-manual, Walker, rolling (trapeze and BR on bed, has upright walker)   Bathroom Shower/Tub: Walk-in shower  Bathroom Toilet: Handicap height  Bathroom Equipment: Grab bars in shower, Shower chair, Grab bars around toilet    Receives Help From: Family  ADL Assistance: Needs assistance (daughter will assist with shower; pt primarily able to complete sponge bath and dressing without assistance although requires increased time; independnet with toileting and transfers)  Homemaking Assistance: Needs assistance (family completes all)  Ambulation Assistance: Needs assistance  Transfer Assistance: Independent  Active : No  Additional Comments: pt's home is only several 
 [facility]  INPATIENT PHYSICAL THERAPY  DAILY NOTE  STRZ MED SURG 8AB - 8A-17/017-A    Time In: 0742  Time Out: 0809  Timed Code Treatment Minutes: 27 Minutes  Minutes: 27          Date: 2024  Patient Name: Sherice Connolly,  Gender:  female        MRN: [id number]  : 1935  (89 y.o.)     Referring Practitioner: Dr. JOSÉ Vázquez  Diagnosis: sepsis  Additional Pertinent Hx: 89-year-old female presenting to ICU in septic shock as a transfer from [facility].     Patient originally presented to [facility] ED for chief complaint of confusion.  Patient's sister said that she was fine in the morning, mental status decreased throughout the day.  She has had chemo treatment 5 days prior.  Denies emesis, diarrhea, cough, congestion.  History of breast cancer s/p double mastectomy in 2023.     While in their ED patient found to have sepsis treated empirically with vancomycin, Zosyn,1500cc IVF, remained hypotensive started on Levophed.  CXR showed scattered bandlike opacities throughout the lungs decrease compared to prior study.  No areas of increasing lung consolidation.     Prior Level of Function:  Lives With: Alone  Type of Home: House  Home Layout: One level  Home Access: Level entry  Home Equipment: Lift chair, Wheelchair-manual, Walker, rolling (trapeze and BR on bed, has upright walker)   Bathroom Shower/Tub: Walk-in shower  Bathroom Toilet: Handicap height  Bathroom Equipment: Grab bars in shower, Shower chair, Grab bars around toilet    Receives Help From: Family  ADL Assistance: Needs assistance (daughter will assist with shower; pt primarily able to complete sponge bath and dressing without assistance although requires increased time; independnet with toileting and transfers)  Homemaking Assistance: Needs assistance (family completes all)  Ambulation Assistance: Needs assistance  Transfer Assistance: Independent  Active : No  Additional Comments: pt's home is only several 
 [facility]  INPATIENT PHYSICAL THERAPY  DAILY NOTE  STRZ MED SURG 8AB - 8A-17/017-A    Time In: 1428  Time Out: 1504  Timed Code Treatment Minutes: 36 Minutes  Minutes: 36          Date: 2024  Patient Name: Sherice Connolly,  Gender:  female        MRN: [id number]  : 1935  (89 y.o.)     Referring Practitioner: Dr. JOSÉ Vázquez  Diagnosis: sepsis  Additional Pertinent Hx: 89-year-old female presenting to ICU in septic shock as a transfer from [facility].     Patient originally presented to [facility] ED for chief complaint of confusion.  Patient's sister said that she was fine in the morning, mental status decreased throughout the day.  She has had chemo treatment 5 days prior.  Denies emesis, diarrhea, cough, congestion.  History of breast cancer s/p double mastectomy in 2023.     While in their ED patient found to have sepsis treated empirically with vancomycin, Zosyn,1500cc IVF, remained hypotensive started on Levophed.  CXR showed scattered bandlike opacities throughout the lungs decrease compared to prior study.  No areas of increasing lung consolidation.     Prior Level of Function:  Lives With: Alone  Type of Home: House  Home Layout: One level  Home Access: Level entry  Home Equipment: Lift chair, Wheelchair-manual, Walker, rolling (trapeze and BR on bed, has upright walker)   Bathroom Shower/Tub: Walk-in shower  Bathroom Toilet: Handicap height  Bathroom Equipment: Grab bars in shower, Shower chair, Grab bars around toilet    Receives Help From: Family  ADL Assistance: Needs assistance (daughter will assist with shower; pt primarily able to complete sponge bath and dressing without assistance although requires increased time; independnet with toileting and transfers)  Homemaking Assistance: Needs assistance (family completes all)  Ambulation Assistance: Needs assistance  Transfer Assistance: Independent  Active : No  Additional Comments: pt's home is only several 
0510: Time out completed at this time. Consent form signed and placed on patient's chart. Correct patient identified utilizing two patient identifiers. Allergies reviewed prior to starting procedure for arterial line placement..    0525: Arterial line placed  
0730 report from current nurse. Bedside rounds completed.pt awake. Answers questions,able to follow commands.  0745 Daughter tila in . Updated on plan for possible OOB to chair. Pt off levo, so possible transfer out of unit.  0750  here.spoke with daughter.ordered echo.  0805 Echo called will plan for after lunch for echo.  
0840 PT here. Pt assessed. Pt then assisted oob to chair with gait belt and walker.pt c/o h/a states feels dizzy and has some double vision.Daughter states the double vision and dizziness is chronic. States she takes tylenol for h/a.  Medicated for h/a with tylenol.  0910 Breakfast here.  1095  is states will transfer pt out of icu.  
1050 pt assisted with OT back to bed using walker and gait belt.Pt tolerated well.  1110 [place] removed from rt wrist. Pressure held x 5 min then dry dressing applied.Prior to removing a line nibp taken in lt and rt forearm. Pt has had recent bilateral mastectomy and family states forearm is where bp is checked in outpt clinic.  1117 Removed rij cvc. Pressure held x 10 minutes then dry dressing applied. No bleeding or swelling at site.  1130 Removed khan.pericare given skin care to excoriated areas in groin area bilaterally.dry soft cloth placed.   1150 Skin care given at breast fold areas dry soft cloths placed. Pt tolerated well.  1215 pt resting in bed.  1230 Informed family of room #8A17. Also given new unit #.Awaiting nurse call for report.  
1250 Report to Leigh RN on 8ab.  1340 Pt transferred to 8A17 per bed.  
Cardiology Progress Note      Patient:  Sherice Connolly  YOB: 1935  MRN: [id number]   Acct: [id number]  Admit Date:  4/17/2024  Primary Cardiologist:  none  Note per Dr maynard:  \"CHIEF COMPLAINT: Altered mental status  Reason for Consult: \"Elevated troponin 200's, A-fib, dementia unable to assess for CP.\"     HPI:   History obtained from patient, patient's family, and chart review.  This is an 89-year-old female who presented to [facility] ED on 04/16/2024 secondary to altered mentation.  At the outside facility patient was found to be septic and hypotensive.  She was started on Levophed and transferred to [facility] ICU on 04/17/2024.  Cardiology service was consulted secondary to a troponin elevation as well as a known history of atrial fibrillation.  Patient was seen and evaluated at the bedside.  She is alert and oriented with the daughter present.  The patient's daughter states that she sometimes has difficulty with short-term memory.  Review of systems is positive for generalized headache intermittently, constant dizziness secondary to history of nystagmus, chest congestion with cough with sputum production.  Patient denies vision changes, chest pain/tightness/pressure, shortness of breath, palpitations, heartburn/indigestion, bowel movement changes. The patient's daughter confirms the patient does not have any history of ischemic cardiovascular disease. She confirms the patient's rate control regimen of metoprolol with current use of Xarelto. She denies any additional acute symptoms or concerns.      Cardiac History:  - Not established with cardiologist  - No prior catheterization; reference to Stress 04/24/2017 (\"findings of moderate sized reversible perfusion defect involving the anterior wall\")  - 07/26/2018 Stress: Possible mild small region of scar formation of the lateral wall. No evidence of acute ischemia.  - Known history of paroxysmal atrial fibrillation with previous use of diltiazem, 
Comprehensive Nutrition Assessment    Type and Reason for Visit:  Initial, Positive Nutrition Screen, Wound    Nutrition Recommendations/Plan:   Recommend a Multivitamin daily.  Started Ensure Plus TID and Narinder BID.  Continue current diet.     Malnutrition Assessment:  Malnutrition Status:  At risk for malnutrition (Comment) (04/18/24 1154)    Context:  Chronic Illness     Findings of the 6 clinical characteristics of malnutrition:  Energy Intake:  75% or less estimated energy requirements for 1 month or longer (per family report)  Weight Loss:  No significant weight loss (-8.7% weight loss in one year)     Body Fat Loss:  No significant body fat loss     Muscle Mass Loss:  No significant muscle mass loss    Fluid Accumulation:  Mild Extremities   Strength:  Not Performed    Nutrition Assessment: Pt. nutritionally compromised AEB wounds. At risk for further nutrition compromise r/t increased nutrient needs for wound healing, admit d/t AMS, septic shock d/t complicated UTI, LESLIE on CKD and underlying medical condition (Hx: Breast Cancer s/p double mastectomy in October 2023 s/p chemotherapy, CHF, cardiomegaly, Atrial Fibrillation, CKD, HTN, Nystagmus, Hypothyroidism).       Nutrition Related Findings:    Pt. Report/Treatments/Miscellaneous: Pt seen with family present; pt confused during visit and unable to answer questions. Pt s/p chemotherapy 6 days ago. Family reports pt with decreased appetite over the month or so worsening over the past week consuming bites of meals at best effort. Family reports pt with unplanned weight loss of 15 lbs in the past 6 months since her double mastectomy in October 2023. Family states pt was consuming Ensure at home if she did not consume much of a meal. Pt is amenable to consume Ensure Plus TID and Narinder BID.  GI Status: last BM x2 on 4/17.  Pertinent Labs: BUN 30, Cr. 1.3, Glucose 135  Pertinent Meds: Rocephin, Folic Acid, Synthroid, Protonix, Vancomycin, Vitamin B-12   Wound 
Discharge instructions reviewed with patient and daughter at bedside, teach back provided.. Patient discharged home with daughter at this time with all personal belongings. [facility] to contact daughter to set up visit.   
[facility]   PROGRESS NOTE      Patient: Sherice Connolly  Room #: 4D-07/007-A            YOB: 1935  Age: 89 y.o.  Gender: female            Admit Date & Time: 4/17/2024  4:02 AM    Assessment:    The patient was asleep at the time of this attempted visit.  The patient's daughter present shared a desire to not wake the patient.     Interventions:  The patient was provided silent prayer.    Outcomes:  The patient remains sleeping at the end of the visit.     Plan:  1.Spiritual care will continue to follow the patient according to [facility] spiritual care SOP.       Electronically signed by Chaplain Marion, on 4/17/2024 at 1:24 PM.  Spiritual Care Department  [facility]  [phone]     04/17/24 1322   Encounter Summary   Encounter Overview/Reason  Initial Encounter   Service Provided For: Patient;Family;Patient and family together   Referral/Consult From: [facility] Children   Last Encounter  04/17/24   Complexity of Encounter Low   Begin Time 1015   End Time  1020   Total Time Calculated 5 min   Spiritual/Emotional needs   Type Spiritual Support   Assessment/Intervention/Outcome   Assessment Coping   Intervention Empowerment   Outcome Refused/Declined       
[facility]  PHYSICAL THERAPY MISSED TREATMENT NOTE  STRZ MED SURG 8AB    Date: 2024  Patient Name: Sherice Connolly        MRN: [id number]   : 1935  (89 y.o.)  Gender: female   Referring Practitioner: Dr. JOSÉ Vázquez  Diagnosis: sepsis         REASON FOR MISSED TREATMENT:  Patient at testing and/or off unit.  Per RN patient is in dialysis at this time. PT to attempt to treat at next available date as time allows.       
[facility], Dr Madrigal. 88 yo hx breast cancer on chemo, last chemo 1 week ago. Urosepsis and hypotension, on levophed. Lactate 3.1, WBC 7.1, H&H 8.5/27.1, creat 1.7, CXR cardiomegaly, no pneumonia. Fluids, Levo, vanc and zosyn given. DNRCCA. Initial BP sys 70's. Current vitals 72/58 , 96, 95% on 2L, T 102.6F.     
[facility]  STRZ MED SURG 8AB  Occupational Therapy  Daily Note  Time:   Time In: 1432  Time Out: 1500  Timed Code Treatment Minutes: 28 Minutes  Minutes: 28          Date: 2024  Patient Name: Sherice Connolly,   Gender: female      Room: 8A-17/017-A  MRN: [id number]  : 1935  (89 y.o.)  Referring Practitioner: Ryan Vázquez DO  Diagnosis: sepsis  Additional Pertinent Hx: Per H&P: Patient is Sherice Connolly 89-year-old female presenting to ICU in septic shock as a transfer from [facility].     Patient originally presented to [facility] ED for chief complaint of confusion.  Patient's sister said that she was fine in the morning, mental status decreased throughout the day.  She has had chemo treatment 5 days prior.  Denies emesis, diarrhea, cough, congestion.  History of breast cancer s/p double mastectomy in 2023.     While in their ED patient found to have sepsis treated empirically with vancomycin, Zosyn,1500cc IVF, remained hypotensive started on Levophed.  CXR showed scattered bandlike opacities throughout the lungs decrease compared to prior study.    Restrictions/Precautions:  Restrictions/Precautions: Fall Risk     Social/Functional History:  Lives With: Alone  Type of Home: House  Home Layout: One level  Home Access: Level entry  Home Equipment: Lift chair, Wheelchair-manual, Walker, rolling (trapeze and BR on bed, has upright walker)   Bathroom Shower/Tub: Walk-in shower  Bathroom Toilet: Handicap height  Bathroom Equipment: Grab bars in shower, Shower chair, Grab bars around toilet    Receives Help From: Family  ADL Assistance: Needs assistance (daughter will assist with shower; pt primarily able to complete sponge bath and dressing without assistance although requires increased time; independnet with toileting and transfers)  Homemaking Assistance: Needs assistance (family completes all)  Ambulation Assistance: Needs assistance  Transfer Assistance: 
[facility]   PROGRESS NOTE      Patient: Sherice Connolly  Room #: 8A-17/017-A            YOB: 1935  Age: 89 y.o.  Gender: female            Admit Date & Time: 4/17/2024  4:02 AM    Assessment:    The patient declined a visit today.    Interventions:  The patient was provided information about Spiritual Care being available.     Outcomes:  The  wished the patient a positive day.     Plan:  1.Spiritual care will continue to follow the patient according to [facility] spiritual care SOP.       Electronically signed by Chaplain Marion, on 4/25/2024 at 2:18 PM.  Spiritual Care Department  [facility]  [phone]     04/25/24 1414   Encounter Summary   Encounter Overview/Reason Follow-up   Service Provided For Patient;Family;Patient and family together   Referral/Consult From [facility]   Support System Children   Last Encounter  04/25/24   Complexity of Encounter Low   Begin Time 1405   End Time  1410   Total Time Calculated 5 min   Spiritual/Emotional needs   Type Spiritual Support   Assessment/Intervention/Outcome   Assessment Unable to assess   Intervention Active listening   Outcome Refused/Declined       
Patient arrived to unit from [facility] via squad. Patient transferred to ICU bed and placed on continuous ICU bedside monitor. Patient admitted for Sepsis ([facility]) [A41.9]. Vitals obtained. See flowsheets. Patient's IV access includes 20 g right wrist. Current infusions and rates of infusion include levophed at 2mcg/min. Assessment completed by Jeremías/Trixie. Two nurse skin assessment completed by ABHISHEK Veronica and ABHISHEK Marcum. See flowsheets for assessment details. Policies and procedures of ICU able to be explained to patient at this time. Family member(s)/representative(s) present at time of admission include Daughter. Patient rights explained to family member(s)/representatives and patient, as able. Patient/patient's family member(s)/representative(s) Declined to have physician notified of their admission. All questions posed by patient's family member(s)/representative(s) and patient answered at this time.     
Pharmacy Medication History Note      List of current medications patient is taking is complete.    Source of information: Surescripts, med list, patients daughters    Changes made to medication list:  Medications removed (include reason, ex. therapy complete or physician discontinued):  Aspirin 81 mg tab- no longer takes  Medications added/doses adjusted:  Added  Centrum multivitamin 1 tab po daily  Melatonin Gummy 10 mg Take 2 gummy po nightly  Changed  Acetaminophen 650 mg daily prn changed to TID prn pain  Amiodarone 200 mg 0.5 tab po daily changed to 1 tab po daily  Benzonatate 100 mg cap BID changed to daily  Cranberry plus probiotic BID changed to daily  Metoprolol tartrate 25 mg daily changed to metoprolol succinate 25 mg daily  Vitamin D3 1000 units 5 tab po daily changed to 5000 units 1 tab po daily    Other notes (ex. Recent course of antibiotics, Coumadin dosing):  Denies use of other OTC or herbal medications.    Allergies reviewed    Electronically signed by Kailey Gonzalez on 4/18/2024 at 10:03 AM                                                    
Pharmacy Note  BioFire Result    Sherice Connolly is a 89 y.o. female, with a positive blood culture result    Communication received from Tanya, laboratory employee on 4/20/2024 at 9:10 PM    First Gram stain result: gram negative bacilli    BioFire BCID result: E. coli no resistance genes detected    BioFire BCID and gram stain congruent? Yes    Suspected source? Urine culture positive for E. coli    Patient chart has been reviewed for signs/symptoms of infection: Yes  /72   Pulse 79   Temp 98.2 °F (36.8 °C) (Oral)   Resp 16   Ht 1.575 m (5' 2\")   Wt 100.5 kg (221 lb 9 oz)   SpO2 95%   BMI 40.52 kg/m²   Lab Results   Component Value Date    WBC 7.9 04/19/2024     Allergies reviewed  Baclofen, Keflex [cephalexin], and Morphine    Renal function reviewed  Estimated Creatinine Clearance: 40 mL/min (based on SCr of 1.1 mg/dL).    Current antibiotic regimen: Rocephin     Intervention needed: No    Individual contacted: Rashmi WEISS    Recommendations: Continue current therapy. Can inform the provider of results in the am.    Recommendations accepted? Yes    Trish Jett [facility]  4/20/2024 9:10 PM    
[facility]  STRZ MED SURG 8AB  Occupational Therapy  Daily Note  Time:   Time In: 1335  Time Out: 1402  Timed Code Treatment Minutes: 27 Minutes  Minutes: 27          Date: 2024  Patient Name: Sherice Connolly,   Gender: female      Room: 8A-17/017-A  MRN: [id number]  : 1935  (89 y.o.)  Referring Practitioner: Ryan Vázquez DO  Diagnosis: sepsis  Additional Pertinent Hx: Per H&P: Patient is Sherice Connolly 89-year-old female presenting to ICU in septic shock as a transfer from [facility].     Patient originally presented to [facility] ED for chief complaint of confusion.  Patient's sister said that she was fine in the morning, mental status decreased throughout the day.  She has had chemo treatment 5 days prior.  Denies emesis, diarrhea, cough, congestion.  History of breast cancer s/p double mastectomy in 2023.     While in their ED patient found to have sepsis treated empirically with vancomycin, Zosyn,1500cc IVF, remained hypotensive started on Levophed.  CXR showed scattered bandlike opacities throughout the lungs decrease compared to prior study.    Restrictions/Precautions:  Restrictions/Precautions: Fall Risk     Social/Functional History:  Lives With: Alone  Type of Home: House  Home Layout: One level  Home Access: Level entry  Home Equipment: Lift chair, Wheelchair-manual, Walker, rolling (trapeze and BR on bed, has upright walker)   Bathroom Shower/Tub: Walk-in shower  Bathroom Toilet: Handicap height  Bathroom Equipment: Grab bars in shower, Shower chair, Grab bars around toilet    Receives Help From: Family  ADL Assistance: Needs assistance (daughter will assist with shower; pt primarily able to complete sponge bath and dressing without assistance although requires increased time; independnet with toileting and transfers)  Homemaking Assistance: Needs assistance (family completes all)  Ambulation Assistance: Needs assistance  Transfer Assistance: 
Pt was in bed as her daughter was with her. She was dealing with sepsis. She talked a lot during the visit. She was hopeful and encouraged. She was blessed.    04/18/24 1430   Encounter Summary   Service Provided For: Patient and family together   Referral/Consult From: [facility]   Support System Children   Last Encounter  04/18/24   Complexity of Encounter Low   Begin Time 0950   End Time  0957   Total Time Calculated 7 min   Spiritual/Emotional needs   Type Spiritual Support   Assessment/Intervention/Outcome   Assessment Hopeful   Intervention Empowerment   Outcome Encouraged;Engaged in conversation       
[facility]   Pharmacy Pharmacokinetic Monitoring Service - Vancomycin    Indication: sepsis  Target Concentration: Dosing based on anticipated concentration < 15 mg/L due to renal impairment/insufficiency  Day of Therapy: 1  Additional Antimicrobials: ceftriaxone    Pertinent Laboratory Values:   Wt Readings from Last 1 Encounters:   04/17/24 96 kg (211 lb 10.3 oz)     Temp Readings from Last 1 Encounters:   04/17/24 97.4 °F (36.3 °C) (Oral)     Estimated Creatinine Clearance: 23 mL/min (A) (based on SCr of 1.9 mg/dL (H)).  Recent Labs     04/17/24  0440   CREATININE 1.9*   BUN 29*   WBC 12.3*       Assessment:  Date/Time Current Dose Concentration Timing of Concentration (h)   4/17 1351 1000mg x1 8.3 12 hrs post dose   Note: Serum concentrations collected for AUC dosing may appear elevated if collected in close proximity to the dose administered, this is not necessarily an indication of toxicity    Plan:  Vanc 1250 mg q24h  Repeat vancomycin concentration not ordered at this time  Pharmacy will continue to monitor patient and adjust therapy as indicated    Thank you for the consult,  Mariana Driscoll, Pharm.D., BCPS, BCCCP 4/17/2024 3:33 PM       
[facility]   Pharmacy Pharmacokinetic Monitoring Service - Vancomycin     Sherice Connolly is a 89 y.o. female starting on vancomycin therapy for sepsis. Pharmacy consulted by Dr Garland for monitoring and adjustment.    Target Concentration: Dosing based on anticipated concentration < 15 mg/L due to renal impairment/insufficiency    Additional Antimicrobials: ceftriaxone    Pertinent Laboratory Values:   Wt Readings from Last 1 Encounters:   04/17/24 96 kg (211 lb 10.3 oz)     Temp Readings from Last 1 Encounters:   04/17/24 97.4 °F (36.3 °C) (Oral)     Estimated Creatinine Clearance: 23 mL/min (A) (based on SCr of 1.9 mg/dL (H)).  Recent Labs     04/17/24  0440   CREATININE 1.9*   BUN 29*   WBC 12.3*     Pertinent Cultures:  Date Source Results   4/17 BCx2 sent   4/17 UC sent   MRSA Nasal Swab: showed MRSA positive result on 4/17    Plan:  Concentration-guided dosing due to renal impairment/insufficiency  Patient received vancomycin 1000mg x1 at OSH  Check random level  Renal labs as indicated   Pharmacy will continue to monitor patient and adjust therapy as indicated    Thank you for the consult,  Mariana Driscoll, Pharm.D., BCPS, BCCCP 4/17/2024 3:28 PM        
[facility]  INPATIENT PHYSICAL THERAPY  EVALUATION  [facility] ICU 4D - 4D-07/007-A    Time In: 0837  Time Out: 0857  Timed Code Treatment Minutes: 10 Minutes  Minutes: 20          Date: 2024  Patient Name: Sherice Connolly,  Gender:  female        MRN: [id number]  : 1935  (89 y.o.)      Referring Practitioner: Dr. JOSÉ Vázquez  Diagnosis: sepsis  Additional Pertinent Hx: 89-year-old female presenting to ICU in septic shock as a transfer from [facility].     Patient originally presented to [facility] ED for chief complaint of confusion.  Patient's sister said that she was fine in the morning, mental status decreased throughout the day.  She has had chemo treatment 5 days prior.  Denies emesis, diarrhea, cough, congestion.  History of breast cancer s/p double mastectomy in 2023.     While in their ED patient found to have sepsis treated empirically with vancomycin, Zosyn,1500cc IVF, remained hypotensive started on Levophed.  CXR showed scattered bandlike opacities throughout the lungs decrease compared to prior study.  No areas of increasing lung consolidation.     Restrictions/Precautions:  Restrictions/Precautions: Fall Risk    Subjective:  Chart Reviewed: Yes  Patient assessed for rehabilitation services?: Yes  Subjective: pleasantly confused, family present and supportive    General:     Vision: Impaired  Vision Exceptions:  (chronic double vision and nystagmus)  Hearing: Exceptions to WFL  Hearing Exceptions: Hard of hearing/hearing concerns       Pain: not rated, c/o HA    Vitals: Oxygen: on room air with O2 sat at 95-97%    Social/Functional History:    Lives With: Alone  Type of Home: House  Home Layout: One level  Home Access: Level entry  Home Equipment: Lift chair, Wheelchair-manual, Walker, rolling (trapeze and BR on bed, has upright walker)                   Ambulation Assistance: Independent  Transfer Assistance: Independent    Active : No     Additional Comments: 
[facility]  STRZ MED SURG 8AB  Occupational Therapy  Daily Note  Time:   Time In: 930  Time Out: 954  Timed Code Treatment Minutes: 24 Minutes  Minutes: 24          Date: 2024  Patient Name: Sherice Connolly,   Gender: female      Room: 8A-17/017-A  MRN: [id number]  : 1935  (89 y.o.)  Referring Practitioner: Ryan Vázquez DO  Diagnosis: sepsis  Additional Pertinent Hx: Per H&P: Patient is Sherice Connolly 89-year-old female presenting to ICU in septic shock as a transfer from [facility].     Patient originally presented to [facility] ED for chief complaint of confusion.  Patient's sister said that she was fine in the morning, mental status decreased throughout the day.  She has had chemo treatment 5 days prior.  Denies emesis, diarrhea, cough, congestion.  History of breast cancer s/p double mastectomy in 2023.     While in their ED patient found to have sepsis treated empirically with vancomycin, Zosyn,1500cc IVF, remained hypotensive started on Levophed.  CXR showed scattered bandlike opacities throughout the lungs decrease compared to prior study.    Restrictions/Precautions:  Restrictions/Precautions: Fall Risk     Social/Functional History:  Lives With: Alone  Type of Home: House  Home Layout: One level  Home Access: Level entry  Home Equipment: Lift chair, Wheelchair-manual, Walker, rolling (trapeze and BR on bed, has upright walker)   Bathroom Shower/Tub: Walk-in shower  Bathroom Toilet: Handicap height  Bathroom Equipment: Grab bars in shower, Shower chair, Grab bars around toilet    Receives Help From: Family  ADL Assistance: Needs assistance (daughter will assist with shower; pt primarily able to complete sponge bath and dressing without assistance although requires increased time; independnet with toileting and transfers)  Homemaking Assistance: Needs assistance (family completes all)  Ambulation Assistance: Needs assistance  Transfer Assistance: 
[facility]  INPATIENT OCCUPATIONAL THERAPY  STR ICU 4D  EVALUATION    Time:   Time In: 1030  Time Out: 1059  Timed Code Treatment Minutes: 15 Minutes  Minutes: 29          Date: 2024  Patient Name: Sherice Connolly,   Gender: female      MRN: [id number]  : 1935  (89 y.o.)  Referring Practitioner: Ryan Vázquez DO  Diagnosis: sepsis  Additional Pertinent Hx: Per H&P: Patient is Sherice Connolly 89-year-old female presenting to ICU in septic shock as a transfer from [facility].     Patient originally presented to [facility] ED for chief complaint of confusion.  Patient's sister said that she was fine in the morning, mental status decreased throughout the day.  She has had chemo treatment 5 days prior.  Denies emesis, diarrhea, cough, congestion.  History of breast cancer s/p double mastectomy in 2023.     While in their ED patient found to have sepsis treated empirically with vancomycin, Zosyn,1500cc IVF, remained hypotensive started on Levophed.  CXR showed scattered bandlike opacities throughout the lungs decrease compared to prior study.    Restrictions/Precautions:  Restrictions/Precautions: Fall Risk    Subjective  Chart Reviewed: Yes, Orders, Progress Notes, History and Physical  Patient assessed for rehabilitation services?: Yes  Family / Caregiver Present: Yes (daughter)    Subjective: Pt seated in bedside chair upon arrival, dozing off, agreeable to OT session and returning to bed. Pt pleasantly confused throughout, frequently repeating self without awareness.    Pain: 0/10:     Vitals: Blood Pressure: 130/59 (RN pulling arterial line at end of session)  Oxygen: 94% on RA  Heart Rate: 89 bpm  All vitals remained stable throughout    Social/Functional History:  Lives With: Alone  Type of Home: House  Home Layout: One level  Home Access: Level entry  Home Equipment: Lift chair, Wheelchair-manual, Walker, rolling (trapeze and BR on bed, has upright walker)   Bathroom 
Fiber/LOUIS  ADULT ORAL NUTRITION SUPPLEMENT; Breakfast, Lunch, Dinner; Standard High Calorie/High Protein Oral Supplement  ADULT ORAL NUTRITION SUPPLEMENT; Lunch, Dinner; Wound Healing Oral Supplement    Anthropometric Measures:  Height: 157.5 cm (5' 2\")  Ideal Body Weight (IBW): 110 lbs  Admission Body Olena: 96 kg (211 lb 10.3 oz) (4/17; +non-pitting edema BLE)  Current Body Weight: 100.2 kg (221 lb) (4/20, non-pitting edema)  BMI: Body mass index is 40.52 kg/m².  BMI Classification: Class III Obesity (>/=40)     Wt Readings from Last 10 Encounters:   04/20/24 100.5 kg (221 lb 9 oz)   04/28/23 105 kg (231 lb 7.7 oz)   01/20/23 97.5 kg (215 lb)   03/18/22 103 kg (227 lb)   09/03/21 104.8 kg (231 lb)   03/19/21 112 kg (247 lb)   09/18/20 109.8 kg (242 lb)   03/06/20 108 kg (238 lb 1.6 oz)   03/01/19 109.8 kg (242 lb)     Estimated Daily Nutrient Needs:  Energy (kcal/day): 96 kg  Weight Used for energy calculation: 9381-9468 kcal/day (15-18 kcal/kg)   Protein (g/day):  g/day (1.2-2 g/kg)    Weight Used for protein calculation: 50 kg      Nutrition Diagnosis:   Inadequate oral intake related to inadequate protein-energy intake as evidenced by poor intake prior to admission, wounds    Nutrition Interventions:   Nutrition and/ or Nutrient Delivery: Continue Current Diet, Continue Oral Nutrition Supplement   Nutrition Education/ Counseling: Education not indicated   Coordination of Nutrition Care: Continue to monitor while inpatient     Goals:  Previous Recommendations Implemented: Yes   Previous Goal Met: Progressing toward Goal(s)   Goals: Meet at least 75% of estimated needs, by next RD assessment     Nutrition Monitoring and Evaluation:   Food/ Nutrient Intake Outcomes: Food and Nutrient Intake, Supplement Intake   Physical Sings/ Symptoms Outcomes: Biochemical Data, GI Status, Fluid Status or Edema, Hemodynamic Status, Nutrition Focused Physical Findings, Skin, Weight     Discharge Planning:    Continue Oral 
Independent    Active : No  Patient's  Info: Daughter Samantha and family members  Leisure & Hobbies: Pt just started going to adult day care before hospitalization; plans to go 3x/week from ~930-4 and work up to going 5x/week.  Additional Comments: pt's home is only several steps away from dtr's home but not connected per family and pt (daughter still working and gone during day).  pt has hx of dizziness and was told it was a deterioration of nerve causing double vision and dizziness-pt chronically closes one eye to improve vision-this has been occuring for >4yrs. Pt stays primarily in w/c and only transfers to/from w/c during day if family is not there, otherwise will walk with RW when family present to assist.    SUBJECTIVE: Nurse ok'd session. Patient seated in bedside chair upon arrival. Daughter present and encouraging during session. Patient agreeable to OT session with min encouragement provided     PAIN: Complains of pain in buttocks     Vitals: Vitals not assessed per clinical judgement, see nursing flowsheet    COGNITION: Decreased Recall, Decreased Insight, Decreased Problem Solving, and Decreased Safety Awareness    ADL:   Toileting: Maximum Assistance.  For hygiene and clothing management, increased time with use of bathroom this AM   Toilet Transfer: Moderate Assistance. - Adenike to/from BSC with increased time, cues for safe/proper technique .    IADL:   Not Tested    BALANCE:  Sitting Balance:  Supervision.    Standing Balance: Minimal Assistance. - CGA     Completed static standing with BUE support on walker requiring Adenike-CGA for balance with no LOB noted. Tactile/verbal cues provided for upright posture as patient tends to flex forward. Patient tolerated standing for approx 1 minute, seated rest break required after task. Completed to increase activity tolerance and balance required for ADLs     BED MOBILITY:  Not Tested    TRANSFERS:  Sit to Stand:  Moderate Assistance. - Adenike from 
Marisol RODRIGUEZ.        Electronically signed by Vero Aly DO,PGY-[id number]   
Yolanda Jones MD on Thu Apr 18, 2024  7:40 PM       Left Ventricle: Moderately reduced left ventricular systolic function with a visually estimated EF of 30 - 35%. Left ventricle size is normal. Normal wall thickness. Mild global hypokinesis present. Severe hypokinesis of the following segments: apical anterior. Normal diastolic function.    Aortic Valve: Mildly calcified cusp.    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    Tricuspid Valve: Moderate regurgitation.    Left Atrium: Left atrium is mildly dilated.    Image quality is adequate.     Finalized by Yolanda Jones MD on Thu Apr 18, 2024  7:38 PM       Left Ventricle: Moderately reduced left ventricular systolic function with a visually estimated EF of 30 - 35%. Left ventricle size is normal. Normal wall thickness. Mild global hypokinesis present. Severe hypokinesis of the following segments: apical anterior. Normal diastolic function.    Aortic Valve: Mildly calcified cusp.    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    Left Atrium: Left atrium is mildly dilated.    Image quality is adequate.     Stress:     Left Heart Cath:     Lab Data:    Cardiac Enzymes:  No results for input(s): \"CKTOTAL\", \"CKMB\", \"CKMBINDEX\", \"TROPONINI\" in the last 72 hours.    CBC:   Lab Results   Component Value Date/Time    WBC 7.9 04/19/2024 07:12 AM    RBC 2.97 04/19/2024 07:12 AM    HGB 9.6 04/19/2024 07:12 AM    HCT 32.8 04/19/2024 07:12 AM    PLT 85 04/19/2024 07:12 AM       CMP:    Lab Results   Component Value Date/Time     04/19/2024 07:12 AM    K 4.6 04/19/2024 07:12 AM     04/19/2024 07:12 AM    CO2 16 04/19/2024 07:12 AM    BUN 30 04/19/2024 07:12 AM    CREATININE 1.1 04/19/2024 07:12 AM    LABGLOM 49 04/19/2024 07:12 AM    LABGLOM 41 04/20/2023 12:00 AM    GLUCOSE 121 04/19/2024 07:12 AM    CALCIUM 8.8 04/19/2024 07:12 AM       Hepatic Function Panel:    Lab 
maintaining midline despite cues provided. Patient completed standing x3 trials, tolerating standing for approx 1-2 minutes each stand. Requiring seated rest break on flaps of charity stedy after each trial of standing with moderate fatigue/SOB noted. Patient completed standing task to increase activity tolerance and balance required for ADLs    BED MOBILITY:  Rolling to Left: Maximum Assistance +1 with increased time, cues for technique, use of side rail  Supine to Sit: Maximum Assistance +1 with increased time, HOB slightly elevated, use of side rail  Scooting: Maximum Assistance To scoot hips towards EOB with increased time     TRANSFERS:  Sit to Stand:  Maximum Assistance. +1 from EOB x2 trials at RW level, Moderate Assist +1 from EOB using charity stedy  Stand to Sit: Maximum Assistance. +1 to EOB x2 trials at RW level. Max-modA +1 to bedside chair using charity stedy   Patient required mod-max cues throughout for safe/proper technique with decreased recall noted     Modified [place] Scale:  Not Applicable    ASSESSMENT:     Activity Tolerance:  Patient tolerance of  treatment: Fair treatment tolerance, Limited by fatigue, Reduced activity pace, and Need for increased rest breaks      Discharge Recommendations: Continue to assess pending progress, Subacute/skilled nursing facility, Not safe to return home at this time, and Patient would benefit from continued OT at discharge  Equipment Recommendations: Other: discussed option of BSC although daughter declines need at this time.  Plan: Times Per Week: 5x  Current Treatment Recommendations: Strengthening, ROM, Balance training, Functional mobility training, Endurance training, Safety education & training, Patient/Caregiver education & training, Equipment evaluation, education, & procurement, Cognitive reorientation, Self-Care / ADL    Education:  Learners: Patient  Safety with transfers, standing/sitting balance, bed mobility     Goals  Short Term Goals  Time Frame for 
Goal 3: amb >3'x1 with BUE support and SBA to get bed to/from w/c safely  Long Term Goals  Time Frame for Long Term Goals : no LTGs set secondary to short ELOS    Following session, patient left in safe position with all fall risk precautions in place.

## 2024-04-28 NOTE — CARE COORDINATION
4/28/24, 12:32 PM EDT    Patient goals/plan/ treatment preferences discussed by  and .  Patient goals/plan/ treatment preferences reviewed with patient/ family.  Patient/ family verbalize understanding of discharge plan and are in agreement with goal/plan/treatment preferences.  Understanding was demonstrated using the teach back method.  AVS provided by RN at time of discharge, which includes all necessary medical information pertaining to the patients current course of illness, treatment, post-discharge goals of care, and treatment preferences.     Services At/After Discharge: Home Health, Aide services, Nursing service, OT, and PT and       Received a call from [facility] WEISS; daughter wants bridget pt home.      12:32 PM  Pt daughter Yolanda here; she decided to not wait on insurance approval for ECF. Provider okayed home today with . Order written    Post-acute (PAC) provider list was provided to patient. Patient was informed of their freedom to choose PAC provider. Discussed and offered to show the patient the relevant PAC Providers quality and resource use measures on Medicare Compare web site via computer based on patient's goals of care and treatment preferences. Questions regarding selection process were answered.     List provided to daughter, chose 2 agencies:  1 [facility] and 2) [facility].  Called on-call RN at [facility]; pt accepted.      Has needed DME; see AVS for specifics.

## 2024-04-28 NOTE — PLAN OF CARE
Problem: Discharge Planning  Goal: Discharge to home or other facility with appropriate resources     Problem: Discharge Planning  Goal: Discharge to home or other facility with appropriate resources  Outcome: Progressing     Problem: Skin/Tissue Integrity  Goal: Absence of new skin breakdown  4/27/2024 1142 by Lois Phelps RN  Outcome: Adequate for Discharge     Problem: ABCDS Injury Assessment  Goal: Absence of physical injury  4/27/2024 1142 by Lois Phelps RN  Outcome: Adequate for Discharge     Problem: Safety - Adult  Goal: Free from fall injury     Problem: Safety - Adult  Goal: Free from fall injury  Outcome: Progressing     Problem: Pain  Goal: Verbalizes/displays adequate comfort level or baseline comfort level  4/27/2024 1142 by Lois Phelps RN  Outcome: Adequate for Discharge     Problem: Nutrition Deficit:  Goal: Optimize nutritional status  4/27/2024 1142 by Lois Phelps RN  Outcome: Adequate for Discharge     Problem: Chronic Conditions and Co-morbidities  Goal: Patient's chronic conditions and co-morbidity symptoms are monitored and maintained or improved  Outcome: Progressing     Problem: Genitourinary - Adult  Goal: Urinary catheter remains patent  Outcome: Progressing   Care plan reviewed with patient and verbalize understanding of the plan of care and contribute to goal setting.     
  Problem: Discharge Planning  Goal: Discharge to home or other facility with appropriate resources  4/24/2024 2148 by Emilee Rivera RN  Outcome: Progressing  4/24/2024 1715 by Tanya Vigil RN  Outcome: Progressing     Problem: Skin/Tissue Integrity  Goal: Absence of new skin breakdown  Description: 1.  Monitor for areas of redness and/or skin breakdown  2.  Assess vascular access sites hourly  3.  Every 4-6 hours minimum:  Change oxygen saturation probe site  4.  Every 4-6 hours:  If on nasal continuous positive airway pressure, respiratory therapy assess nares and determine need for appliance change or resting period.  4/24/2024 2148 by Emilee Rivera RN  Outcome: Progressing  4/24/2024 1715 by Tanya Vigil RN  Outcome: Progressing     Problem: ABCDS Injury Assessment  Goal: Absence of physical injury  4/24/2024 2148 by Emilee Rivera RN  Outcome: Progressing  4/24/2024 1715 by Tanya Vigil RN  Outcome: Progressing     Problem: Safety - Adult  Goal: Free from fall injury  4/24/2024 2148 by Emilee Rivera RN  Outcome: Progressing  4/24/2024 1715 by Tanya Vigil RN  Outcome: Progressing     Problem: Pain  Goal: Verbalizes/displays adequate comfort level or baseline comfort level  4/24/2024 2148 by Emilee Rivera RN  Outcome: Progressing  4/24/2024 1715 by Tanya Vigil RN  Outcome: Progressing     Problem: Nutrition Deficit:  Goal: Optimize nutritional status  4/24/2024 2148 by Emilee Rivera RN  Outcome: Progressing  4/24/2024 1715 by Tanya Vigil RN  Outcome: Progressing  4/24/2024 1446 by Eveline Tucker RD  Outcome: Progressing  Flowsheets (Taken 4/24/2024 1446)  Nutrient intake appropriate for improving, restoring, or maintaining nutritional needs:   Assess nutritional status and recommend course of action   Monitor oral intake, labs, and treatment plans   Recommend appropriate diets, oral nutritional supplements, and vitamin/mineral supplements   Care plan reviewed with patient.  Patient verbalize 
  Problem: Discharge Planning  Goal: Discharge to home or other facility with appropriate resources  Outcome: Progressing       Consult received. Please see SW note dated 4/22.   
  Problem: Discharge Planning  Goal: Discharge to home or other facility with appropriate resources  Outcome: Progressing     Problem: Skin/Tissue Integrity  Goal: Absence of new skin breakdown  Description: 1.  Monitor for areas of redness and/or skin breakdown  2.  Assess vascular access sites hourly  3.  Every 4-6 hours minimum:  Change oxygen saturation probe site  4.  Every 4-6 hours:  If on nasal continuous positive airway pressure, respiratory therapy assess nares and determine need for appliance change or resting period.  Outcome: Progressing     Problem: ABCDS Injury Assessment  Goal: Absence of physical injury  Outcome: Progressing     Problem: Safety - Adult  Goal: Free from fall injury  Outcome: Progressing     Problem: Pain  Goal: Verbalizes/displays adequate comfort level or baseline comfort level  Outcome: Progressing     Problem: Nutrition Deficit:  Goal: Optimize nutritional status  4/24/2024 1715 by Tanya Vigil RN  Outcome: Progressing  4/24/2024 1446 by Eveline Tucker, RD  Outcome: Progressing  Flowsheets (Taken 4/24/2024 1446)  Nutrient intake appropriate for improving, restoring, or maintaining nutritional needs:   Assess nutritional status and recommend course of action   Monitor oral intake, labs, and treatment plans   Recommend appropriate diets, oral nutritional supplements, and vitamin/mineral supplements     
  Problem: Discharge Planning  Goal: Discharge to home or other facility with appropriate resources  Outcome: Progressing  Flowsheets  Taken 4/20/2024 1925 by Rashmi Franz, RN  Discharge to home or other facility with appropriate resources: Identify barriers to discharge with patient and caregiver  Taken 4/20/2024 0809 by Maria Ines Slater RN  Discharge to home or other facility with appropriate resources: Identify barriers to discharge with patient and caregiver     Problem: Skin/Tissue Integrity  Goal: Absence of new skin breakdown  Description: 1.  Monitor for areas of redness and/or skin breakdown  2.  Assess vascular access sites hourly  3.  Every 4-6 hours minimum:  Change oxygen saturation probe site  4.  Every 4-6 hours:  If on nasal continuous positive airway pressure, respiratory therapy assess nares and determine need for appliance change or resting period.  Outcome: Progressing     Problem: ABCDS Injury Assessment  Goal: Absence of physical injury  Outcome: Progressing     Problem: Safety - Adult  Goal: Free from fall injury  Outcome: Progressing     Problem: Pain  Goal: Verbalizes/displays adequate comfort level or baseline comfort level  Outcome: Progressing     Problem: Nutrition Deficit:  Goal: Optimize nutritional status  Outcome: Progressing     
  Problem: Discharge Planning  Goal: Discharge to home or other facility with appropriate resources  Outcome: Progressing  Flowsheets (Taken 4/19/2024 1930)  Discharge to home or other facility with appropriate resources: Identify barriers to discharge with patient and caregiver     Problem: Skin/Tissue Integrity  Goal: Absence of new skin breakdown  Description: 1.  Monitor for areas of redness and/or skin breakdown  2.  Assess vascular access sites hourly  3.  Every 4-6 hours minimum:  Change oxygen saturation probe site  4.  Every 4-6 hours:  If on nasal continuous positive airway pressure, respiratory therapy assess nares and determine need for appliance change or resting period.  Outcome: Progressing     Problem: ABCDS Injury Assessment  Goal: Absence of physical injury  Outcome: Progressing     Problem: Safety - Adult  Goal: Free from fall injury  Outcome: Progressing     Problem: Pain  Goal: Verbalizes/displays adequate comfort level or baseline comfort level  Outcome: Progressing     Problem: Nutrition Deficit:  Goal: Optimize nutritional status  Outcome: Progressing     
  Problem: Discharge Planning  Goal: Discharge to home or other facility with appropriate resources  Outcome: Progressing  Flowsheets (Taken 4/26/2024 0441)  Discharge to home or other facility with appropriate resources:   Identify barriers to discharge with patient and caregiver   Identify discharge learning needs (meds, wound care, etc)   Refer to discharge planning if patient needs post-hospital services based on physician order or complex needs related to functional status, cognitive ability or social support system   Arrange for needed discharge resources and transportation as appropriate     Problem: Skin/Tissue Integrity  Goal: Absence of new skin breakdown  Description: 1.  Monitor for areas of redness and/or skin breakdown  2.  Assess vascular access sites hourly  3.  Every 4-6 hours minimum:  Change oxygen saturation probe site  4.  Every 4-6 hours:  If on nasal continuous positive airway pressure, respiratory therapy assess nares and determine need for appliance change or resting period.  Outcome: Progressing  Note: Encouraging good fluid and diet intake.  Encourage hygiene  Increase movement and encourage turns.    Maintain every 2 hour turn.  Inspect skin areas  Address issues with open skin with proper wound care per policies and procedures      Problem: ABCDS Injury Assessment  Goal: Absence of physical injury  Outcome: Progressing  Flowsheets (Taken 4/26/2024 0441)  Absence of Physical Injury: Implement safety measures based on patient assessment  Note: Bed in low position  Call light in reach  Bed wheel lock  Teaching to use call light for assistance.       Problem: Safety - Adult  Goal: Free from fall injury  Outcome: Progressing  Flowsheets (Taken 4/26/2024 0441)  Free From Fall Injury: Instruct family/caregiver on patient safety  Note: Bed in low position  Call light in reach  Bed wheel lock  Teaching to use call light for assistance.   Utilize Bed alarm for fall score     Problem: Pain  Goal: 
  Problem: Skin/Tissue Integrity  Goal: Absence of new skin breakdown  Description: 1.  Monitor for areas of redness and/or skin breakdown  2.  Assess vascular access sites hourly  3.  Every 4-6 hours minimum:  Change oxygen saturation probe site  4.  Every 4-6 hours:  If on nasal continuous positive airway pressure, respiratory therapy assess nares and determine need for appliance change or resting period.  Outcome: Progressing     Problem: ABCDS Injury Assessment  Goal: Absence of physical injury  Outcome: Progressing  Flowsheets (Taken 4/17/2024 0716)  Absence of Physical Injury: Implement safety measures based on patient assessment     Problem: Safety - Adult  Goal: Free from fall injury  Outcome: Progressing  Flowsheets (Taken 4/17/2024 0716)  Free From Fall Injury: Instruct family/caregiver on patient safety     
Care plan reviewed with patient and daughter.  Patient and Daughter verbalize understanding of the plan of care and contribute to goal setting.     Problem: Discharge Planning  Goal: Discharge to home or other facility with appropriate resources  Outcome: Progressing  Note: Will transfer out of icu     Problem: Skin/Tissue Integrity  Goal: Absence of new skin breakdown  Description: 1.  Monitor for areas of redness and/or skin breakdown  2.  Assess vascular access sites hourly  3.  Every 4-6 hours minimum:  Change oxygen saturation probe site  4.  Every 4-6 hours:  If on nasal continuous positive airway pressure, respiratory therapy assess nares and determine need for appliance change or resting period.  Outcome: Progressing  Note: Admitted with excoriation in skin folds and also perirectal area.no new noted     Problem: ABCDS Injury Assessment  Goal: Absence of physical injury  Outcome: Progressing     Problem: Safety - Adult  Goal: Free from fall injury  Outcome: Progressing  Note: Safety measures of gait belt,walker,and chair alarm.     Problem: Pain  Goal: Verbalizes/displays adequate comfort level or baseline comfort level  Outcome: Progressing  Note: C/o h/a     Problem: Safety - Medical Restraint  Goal: Remains free of injury from restraints (Restraint for Interference with Medical Device)  Description: INTERVENTIONS:  1. Determine that other, less restrictive measures have been tried or would not be effective before applying the restraint  2. Evaluate the patient's condition at the time of restraint application  3. Inform patient/family regarding the reason for restraint  4. Q2H: Monitor safety, psychosocial status, comfort, nutrition and hydration  Outcome: Completed  Note: No need restraints off.     
New admit patient arrived from [facility]. Patient is 89 y.o female with history of breast cancer on chemotherapy. Last chemo treatment reported  1 week ago. Patient was diagnosed Urosepsis and hypotensive at [facility]. Lab works outside facility reported Lactate 3.1, WBC 7.1, H&H 8.5/27.1, Crea 1.7, and CXR shows cardiomegaly, no pneumonia. Reported patient received 2..5 L IVF bolus,a dose of Zosyn and Vanc, Levophed gtt started at [facility] to support BP/perfusion. Initial BP at outside facility reported at 70's. Last VS before leaving [facility]: Temp 102.6, BP 72/58, HR 96, O2 sat of 95% on 2 L O2 via NC. Patient arrived to ICU with her daughter. Patient is awake, alert, oriented, able to follow commands, and able to answer all question appropriately. Daughter and patient confirmed DNR-CCA code status, primary nurse at bedside. Care transitioned to day shift team.    CRISTIANE Lieberman-CNP  Critical Care Medicine  4/17/2024 4:48 AM   
injury  4/26/2024 1147 by Rob Leung RN  Outcome: Progressing  4/26/2024 0441 by Judd Desai, RN  Outcome: Progressing  Flowsheets (Taken 4/26/2024 0441)  Free From Fall Injury: Instruct family/caregiver on patient safety  Note: Bed in low position  Call light in reach  Bed wheel lock  Teaching to use call light for assistance.   Utilize Bed alarm for fall score     Problem: Pain  Goal: Verbalizes/displays adequate comfort level or baseline comfort level  4/26/2024 1147 by Rob Leung RN  Outcome: Progressing  4/26/2024 0441 by Judd Desai, RN  Outcome: Progressing  Flowsheets (Taken 4/26/2024 0441)  Verbalizes/displays adequate comfort level or baseline comfort level:   Encourage patient to monitor pain and request assistance   Assess pain using appropriate pain scale   Implement non-pharmacological measures as appropriate and evaluate response   Administer analgesics based on type and severity of pain and evaluate response   Notify Licensed Independent Practitioner if interventions unsuccessful or patient reports new pain     Problem: Nutrition Deficit:  Goal: Optimize nutritional status  4/26/2024 1147 by Rob Leung RN  Outcome: Progressing  4/26/2024 0441 by Judd Desai, RN  Outcome: Progressing  Flowsheets (Taken 4/26/2024 0441)  Nutrient intake appropriate for improving, restoring, or maintaining nutritional needs:   Assess nutritional status and recommend course of action   Monitor oral intake, labs, and treatment plans     
nutritional needs: Assess nutritional status and recommend course of action   Care plan reviewed with patient .  Patient  verbalize understanding of the plan of care and contribute to goal setting.

## 2024-04-28 NOTE — CARE COORDINATION
Family member notified Say RN on Sat in am; they called [employer] insurance company and received an auth number [id number]. This CM then called Rose at [facility] and she confirmed the precert is not back yet.   Family insisting on taking pt to SNF on Sat. Explained to RN to not send pt until precert is back and explain to pt family we cannot leave hospital without approval from pt insurance.

## 2024-05-17 ENCOUNTER — OFFICE VISIT (OUTPATIENT)
Dept: NEPHROLOGY | Age: 89
End: 2024-05-17
Payer: MEDICARE

## 2024-05-17 VITALS
HEIGHT: 64 IN | BODY MASS INDEX: 34.83 KG/M2 | DIASTOLIC BLOOD PRESSURE: 82 MMHG | HEART RATE: 88 BPM | OXYGEN SATURATION: 99 % | SYSTOLIC BLOOD PRESSURE: 148 MMHG | WEIGHT: 204 LBS

## 2024-05-17 DIAGNOSIS — N18.31 STAGE 3A CHRONIC KIDNEY DISEASE (HCC): Primary | ICD-10-CM

## 2024-05-17 PROCEDURE — 1123F ACP DISCUSS/DSCN MKR DOCD: CPT | Performed by: INTERNAL MEDICINE

## 2024-05-17 PROCEDURE — 99214 OFFICE O/P EST MOD 30 MIN: CPT | Performed by: INTERNAL MEDICINE

## 2024-05-17 NOTE — PROGRESS NOTES
guarding  Extremities: trace LE edema, +compression stockings  Skin: warm, dry  Neurologic: no tremor, nytsagmus     Medications:  Current Outpatient Medications   Medication Sig Dispense Refill    amiodarone (PACERONE) 100 MG tablet Take 1 tablet by mouth daily 30 tablet 0    Melatonin (MELATONINMAX GUMMIES) 10 MG CHEW Take 2 each by mouth at bedtime      Multiple Vitamins-Minerals (CENTRUM ADULT PO) Take 1 tablet by mouth daily      metoprolol succinate (TOPROL XL) 25 MG extended release tablet Take 1 tablet by mouth daily      Vitamin D3 125 MCG (5000 UT) TABS tablet Take 1 tablet by mouth daily      vitamin B-1 (THIAMINE) 100 MG tablet Take 1 tablet by mouth daily 90 tablet 3    vitamin B-6 (PYRIDOXINE) 50 MG tablet Take 1 tablet by mouth daily 90 tablet 3    Cranberry-Vit C-Probiotic-Ca (CRANBERRY PLUS PROBIOTIC PO) Take 1 capsule by mouth Daily      vitamin E 1000 units capsule Take 1 capsule by mouth daily      benzonatate (TESSALON) 100 MG capsule Take 1 capsule by mouth daily      tamsulosin (FLOMAX) 0.4 MG capsule Take 1 capsule by mouth daily      pantoprazole (PROTONIX) 40 MG tablet Take 1 tablet by mouth daily      levothyroxine (SYNTHROID) 88 MCG tablet Take 1 tablet by mouth daily      furosemide (LASIX) 40 MG tablet Take 1 tablet by mouth daily      vitamin B-12 (CYANOCOBALAMIN) 1000 MCG tablet Take 1 tablet by mouth every other day Per Dania Boswell CNP on 01-.      acetaminophen (TYLENOL) 650 MG extended release tablet Take 1 tablet by mouth every 8 hours as needed for Pain      rivaroxaban (XARELTO) 20 MG TABS tablet Take 1 tablet by mouth daily       No current facility-administered medications for this visit.        Laboratory & Diagnostics:  CBC:   Lab Results   Component Value Date    WBC 7.9 04/19/2024    HGB 9.6 (L) 04/19/2024    HCT 32.8 (L) 04/19/2024    .4 (H) 04/19/2024    PLT 85 (L) 04/19/2024     BMP:    Lab Results   Component Value Date     04/25/2024

## 2025-05-08 LAB
ALBUMIN: 3.9 G/DL
ALP BLD-CCNC: 112 U/L
ALT SERPL-CCNC: 21 U/L
ANION GAP SERPL CALCULATED.3IONS-SCNC: NORMAL MMOL/L
AST SERPL-CCNC: 32 U/L
BILIRUB SERPL-MCNC: 0.8 MG/DL (ref 0.1–1.4)
BUN BLDV-MCNC: 22 MG/DL
CALCIUM SERPL-MCNC: 9.6 MG/DL
CHLORIDE BLD-SCNC: 105 MMOL/L
CO2: 23 MMOL/L
CREAT SERPL-MCNC: 1.4 MG/DL
GFR, ESTIMATED: 33
GLUCOSE BLD-MCNC: 92 MG/DL
POTASSIUM SERPL-SCNC: 4.2 MMOL/L
SODIUM BLD-SCNC: 142 MMOL/L
TOTAL PROTEIN: 6.6 G/DL (ref 6.4–8.2)

## 2025-05-16 ENCOUNTER — OFFICE VISIT (OUTPATIENT)
Dept: NEPHROLOGY | Age: 89
End: 2025-05-16
Payer: MEDICARE

## 2025-05-16 VITALS
DIASTOLIC BLOOD PRESSURE: 66 MMHG | WEIGHT: 184 LBS | OXYGEN SATURATION: 98 % | HEART RATE: 68 BPM | SYSTOLIC BLOOD PRESSURE: 120 MMHG | BODY MASS INDEX: 31.58 KG/M2

## 2025-05-16 DIAGNOSIS — N18.31 STAGE 3A CHRONIC KIDNEY DISEASE (HCC): Primary | ICD-10-CM

## 2025-05-16 PROCEDURE — 1159F MED LIST DOCD IN RCRD: CPT | Performed by: INTERNAL MEDICINE

## 2025-05-16 PROCEDURE — 99214 OFFICE O/P EST MOD 30 MIN: CPT | Performed by: INTERNAL MEDICINE

## 2025-05-16 PROCEDURE — 1123F ACP DISCUSS/DSCN MKR DOCD: CPT | Performed by: INTERNAL MEDICINE

## (undated) DEVICE — GOWN,SIRUS,NON REINFRCD,LARGE,SET IN SL: Brand: MEDLINE

## (undated) DEVICE — BANDAGE,GAUZE,4.5"X4.1YD,STERILE,LF: Brand: MEDLINE

## (undated) DEVICE — GLOVE ORANGE PI 7   MSG9070

## (undated) DEVICE — SUTURE MCRYL SZ 4-0 L18IN ABSRB UD P-3 L13MM 3/8 CIR PRIM Y494G

## (undated) DEVICE — SUTURE NONABSORBABLE BRAIDED 4-0 SH 30 IN BLK PERMA HND K831H

## (undated) DEVICE — PACK PROCEDURE SURG PLAS SC MIN SRHP LF

## (undated) DEVICE — GLOVE SURG SZ 8 L11.77IN FNGR THK9.8MIL STRW LTX POLYMER

## (undated) DEVICE — COTTON BALL ST